# Patient Record
Sex: MALE | Race: BLACK OR AFRICAN AMERICAN | NOT HISPANIC OR LATINO | Employment: FULL TIME | URBAN - METROPOLITAN AREA
[De-identification: names, ages, dates, MRNs, and addresses within clinical notes are randomized per-mention and may not be internally consistent; named-entity substitution may affect disease eponyms.]

---

## 2017-07-17 ENCOUNTER — OFFICE VISIT (OUTPATIENT)
Dept: FAMILY MEDICINE | Facility: HOSPITAL | Age: 32
End: 2017-07-17
Attending: FAMILY MEDICINE
Payer: COMMERCIAL

## 2017-07-17 VITALS
HEART RATE: 67 BPM | WEIGHT: 145.75 LBS | DIASTOLIC BLOOD PRESSURE: 67 MMHG | BODY MASS INDEX: 28.61 KG/M2 | SYSTOLIC BLOOD PRESSURE: 117 MMHG | HEIGHT: 60 IN

## 2017-07-17 DIAGNOSIS — R10.9 ABDOMINAL PAIN, UNSPECIFIED LOCATION: ICD-10-CM

## 2017-07-17 DIAGNOSIS — R19.7 DIARRHEA, UNSPECIFIED TYPE: ICD-10-CM

## 2017-07-17 DIAGNOSIS — Z00.00 ENCOUNTER FOR ANNUAL HEALTH EXAMINATION: Primary | ICD-10-CM

## 2017-07-17 DIAGNOSIS — Z00.00 ENCOUNTER FOR HEALTH MAINTENANCE EXAMINATION: ICD-10-CM

## 2017-07-17 DIAGNOSIS — K25.9 GASTRIC ULCER, UNSPECIFIED CHRONICITY, UNSPECIFIED WHETHER GASTRIC ULCER HEMORRHAGE OR PERFORATION PRESENT: ICD-10-CM

## 2017-07-17 DIAGNOSIS — L81.9 HYPERPIGMENTED SKIN LESION: ICD-10-CM

## 2017-07-17 PROCEDURE — 99203 OFFICE O/P NEW LOW 30 MIN: CPT | Performed by: STUDENT IN AN ORGANIZED HEALTH CARE EDUCATION/TRAINING PROGRAM

## 2017-07-17 NOTE — PROGRESS NOTES
History & Physical  \[facility]\"" FAMILY PRACTICE      SUBJECTIVE:     History of Present Illness:  Patient is a 31 y.o. male presents for chronic abdominal pain that is associated with episodes of diarrhea and constipation that worsens during times of stress/ travel and is relieved with defecation. He has had the sudden urge to defecate for years that fluctuates. His stool is soft, well formed, and has no blood or melena. The frequency of his stool changes as well. He also has intermittent periods of constipation. He denies fever, chills, or a family history of GI medical problems. Certain foods do worsen his abdominal pain with no specific ones noted. He does not eat dairy. The only associated symptom with his urge to defecate is diaphoresis. He denies palpitations, tachycardia, and anxiety symptoms. He reports going to a GI physician years ago who did a colonoscopy and suspected irritable bowel syndrome. Rifaximin was taken which resolved many of his symptoms. He reports that he had ulcers seen on colonoscopy. He also comes in for a hyperpigmentation on his left elbow that is transient for 2 years. It appears randomly and then goes away. Steroids were tried but it did not resolve.     Review of patient's allergies indicates:  No Known Allergies    Past Medical Hx: None  Past surgical hx: hernia repair child  Family History: Mother has HTN and DM    Social History   Substance Use Topics    Smoking status: None    Smokeless tobacco: Not on file    Alcohol use Socially     Sexual Hx: Sexually active with female     Travel Hx: Within the last year went to [facility] and [place]    Review of Systems:  As per Subjective  Review of Systems   Constitutional: Negative for chills, diaphoresis, fever, malaise/fatigue and weight loss.   HENT: Negative for hearing loss.    Eyes: Negative for blurred vision, double vision, pain, discharge and redness.   Respiratory: Positive for cough. Negative for sputum production, shortness of  breath and wheezing.         Dry cough associated with sinusitus   Cardiovascular: Negative for chest pain, palpitations and leg swelling.   Gastrointestinal: Positive for abdominal pain, constipation and diarrhea. Negative for blood in stool, melena, nausea and vomiting.   Genitourinary: Negative for dysuria, frequency, hematuria and urgency.   Musculoskeletal: Negative for joint pain and myalgias.   Skin: Positive for rash.        Bilateral arms   Neurological: Negative.  Negative for weakness and headaches.   Psychiatric/Behavioral: Negative.        OBJECTIVE:     Vital Signs (Most Recent)  Pulse: 67 (07/17/17 1450)  BP: 117/67 (07/17/17 1450)    Physical Exam:  Physical Exam   Constitutional: He is oriented to person, place, and time and well-developed, well-nourished, and in no distress.   HENT:   Head: Normocephalic and atraumatic.   Nose: Nose normal.   Mouth/Throat: Oropharynx is clear and moist.   Eyes: Conjunctivae and EOM are normal. Pupils are equal, round, and reactive to light.   Neck: Normal range of motion. Neck supple.   Cardiovascular: Normal rate, regular rhythm, normal heart sounds and intact distal pulses.    No murmur heard.  Pulmonary/Chest: Effort normal and breath sounds normal.   Abdominal: Soft. Bowel sounds are normal. He exhibits no distension and no mass. There is no tenderness. There is no rebound and no guarding.   Musculoskeletal: Normal range of motion.   Neurological: He is alert and oriented to person, place, and time.   Skin: Skin is warm and dry. Rash noted.   Hyperpigmented lesion on left elbow.     Hyperpigmented lesion on right wrist   Psychiatric: Affect normal.       ASSESSMENT/PLAN:   31 y.o.male with a unconfirmed history of stomach ulcers and irritable bowel syndrome (he was scoped in past but was unsure of results) presents with abdominal pain and transient diarrhea, urgency, and constipation. He also complains of a transient hyperpigmented rash on both extremities.              Plan:     Abdominal pain associated with Diarrhea/Constipation  -2 Omaha III criteria are met for Irritable Bowel Syndrome   -abdominal pain improves with defecation   - abdominal pain associated with change in stool frequency  -No alarming features such as melena, blood in stool, weight loss or family history of GI cancers/Inflammatory bowel disease  -Colonoscopy ordered to rule out ulcerative colitis  -Consider dicyclomine to prevent distressing urges    Hyperpigmented rash  -Punch biopsy scheduled  -Rash does not appear to be dermatitis herpetiformis and patient appears well nourished with a recent weight gain and no signs of malabsorption    Routine physical  -Physical exam was normal  -cbc  -cmp  -lipid panel  -Hepatitis panel    Sexually active male  -Patient asked for STD screening   -HIV  -C/G urine DNA  -RPR    Return to clinic for punch biopsy and lab results    Kolton Cota MD    7/17/2017 Kolton Cota M.D.

## 2017-07-19 NOTE — PROGRESS NOTES
I assume primary medical responsibility for this patient, I have seen the patient, reviewed the case history, findings, diagnosis and treatment plan with the resident and agree that the care is reasonable and necessary.  Frida Gonzalez  7/19/2017

## 2017-07-20 ENCOUNTER — LAB VISIT (OUTPATIENT)
Dept: LAB | Facility: HOSPITAL | Age: 32
End: 2017-07-20
Attending: STUDENT IN AN ORGANIZED HEALTH CARE EDUCATION/TRAINING PROGRAM
Payer: COMMERCIAL

## 2017-07-20 DIAGNOSIS — Z00.00 ENCOUNTER FOR ANNUAL HEALTH EXAMINATION: ICD-10-CM

## 2017-07-20 DIAGNOSIS — Z00.00 ENCOUNTER FOR HEALTH MAINTENANCE EXAMINATION: ICD-10-CM

## 2017-07-20 PROCEDURE — 87591 N.GONORRHOEAE DNA AMP PROB: CPT

## 2017-07-21 LAB
C TRACH DNA SPEC QL NAA+PROBE: NOT DETECTED
N GONORRHOEA DNA SPEC QL NAA+PROBE: NOT DETECTED

## 2017-07-31 ENCOUNTER — PROCEDURE VISIT (OUTPATIENT)
Dept: FAMILY MEDICINE | Facility: HOSPITAL | Age: 32
End: 2017-07-31
Attending: FAMILY MEDICINE
Payer: COMMERCIAL

## 2017-07-31 VITALS
HEIGHT: 62 IN | SYSTOLIC BLOOD PRESSURE: 123 MMHG | BODY MASS INDEX: 26.17 KG/M2 | HEART RATE: 64 BPM | WEIGHT: 142.19 LBS | DIASTOLIC BLOOD PRESSURE: 69 MMHG

## 2017-07-31 DIAGNOSIS — L81.9 HYPERPIGMENTED SKIN LESION: Primary | ICD-10-CM

## 2017-07-31 DIAGNOSIS — K58.0 IRRITABLE BOWEL SYNDROME WITH DIARRHEA: ICD-10-CM

## 2017-07-31 PROBLEM — K58.9 IRRITABLE BOWEL SYNDROME (IBS): Status: ACTIVE | Noted: 2017-07-31

## 2017-07-31 RX ORDER — DICYCLOMINE HYDROCHLORIDE 20 MG/1
20 TABLET ORAL 4 TIMES DAILY
Qty: 56 TABLET | Refills: 0 | Status: SHIPPED | OUTPATIENT
Start: 2017-07-31 | End: 2017-08-14

## 2017-07-31 NOTE — PROCEDURES
"Azam Gregory is a 31 y.o. male patient coming in for a biopsy of a hyperpigmented circular lesion on his left arm. The lesion is on his left elbow that is transient for 2 years. He denies scaling, bleeding, and pruritis. It appears randomly and then goes away. He denies contact with any metals or other materials. Steroids were tried but it did not resolve.     Pulse: 64 (07/31/17 1429)  BP: 123/69 (07/31/17 1429)  Weight: 64.5 kg (142 lb 3.2 oz) (07/31/17 1429)  Height: 5' 2" (157.5 cm) (07/31/17 1429)       PRE-OP DIAGNOSIS: hyperpigmented skin lesion on left arm  POST-OP DIAGNOSIS: Same   PROCEDURE: punch biopsy  Performing Physician: Dr. Kolton Cota  Supervising Physician (if applicable): Dr. Ricardo Hart     PROCEDURE:        Punch 3 mm     The area surrounding the skin lesion was prepared and draped in the usual sterile manner. The lesion was removed in the usual manner by the biopsy method noted above. Hemostasis was assured. The patient tolerated the procedure well.     Closure:               1 suture     Followup: The patient tolerated the procedure well without complications.  Standard post-procedure care is explained and return precautions are given.      Procedures    Consent for a punch biopsy of his left arm was obtained. The risks of the procedure were given. The patient understood the risks and affirmed he wanted the procedure.   A punch biopsy of his left arm was performed. One stitch was placed in the wound.       Return to clinic in  7-10 days for results and removal of the stitch.     Kolton Cota  7/31/2017    "

## 2017-08-16 ENCOUNTER — OFFICE VISIT (OUTPATIENT)
Dept: NEUROLOGY | Facility: HOSPITAL | Age: 32
End: 2017-08-16
Attending: INTERNAL MEDICINE
Payer: COMMERCIAL

## 2017-08-16 VITALS
HEART RATE: 61 BPM | DIASTOLIC BLOOD PRESSURE: 67 MMHG | SYSTOLIC BLOOD PRESSURE: 117 MMHG | HEIGHT: 63 IN | WEIGHT: 145.81 LBS | BODY MASS INDEX: 25.84 KG/M2 | TEMPERATURE: 98 F | RESPIRATION RATE: 16 BRPM

## 2017-08-16 DIAGNOSIS — R19.7 DIARRHEA, UNSPECIFIED TYPE: Primary | ICD-10-CM

## 2017-08-16 PROCEDURE — 99214 OFFICE O/P EST MOD 30 MIN: CPT | Performed by: INTERNAL MEDICINE

## 2017-08-16 RX ORDER — DICYCLOMINE HYDROCHLORIDE 10 MG/1
10 CAPSULE ORAL EVERY 8 HOURS PRN
Qty: 100 CAPSULE | Refills: 3 | Status: SHIPPED | OUTPATIENT
Start: 2017-08-16 | End: 2017-09-15

## 2017-08-16 RX ORDER — DICYCLOMINE HYDROCHLORIDE 20 MG/1
20 TABLET ORAL 3 TIMES DAILY
COMMUNITY
End: 2017-08-16

## 2017-08-16 NOTE — LETTER
August 16, 2017      Kolton Cota MD  [address]  [address]  [place] [address]           [facility]  [address] [place] [address]  Phone: [phone]  Fax: [phone]          Patient: Azam Gregory   MR Number: [id number]   YOB: 1985   Date of Visit: 8/16/2017       Dear Dr. Kolton Cota:    Thank you for referring Azam Gregory to me for evaluation. Attached you will find relevant portions of my assessment and plan of care.    If you have questions, please do not hesitate to call me. I look forward to following Azam Gregory along with you.    Sincerely,    Alf Medrano MD    Enclosure  CC:  No Recipients    If you would like to receive this communication electronically, please contact [email or web address] or [phone] to request more information on [facility] [facility] access.    For providers and/or their staff who would like to refer a patient to [facility], please contact us through our one-stop-shop provider referral line, [facility] , at [phone].    If you feel you have received this communication in error or would no longer like to receive these types of communications, please e-mail [email or web address]

## 2017-08-16 NOTE — PROGRESS NOTES
[facility]    CC: diarrhea     HPI 32 y.o. male with past medical history of chronic, intermittent diarrhea which occurs several times per week in which he has 4-5 formed stools per day associated with a big event, travel or something that causes him anxiety with associated generalized burning and cramping abdominal pain which are relived with having a BM.  He reports he has had these symptoms since childhood.  He denies weight loss, melena, hematochezia, nausea and vomiting.  He has tried rifaximin for these symptoms and felt well on the rifaximin but his symptoms returned once he was off.  He currently takes PRN bentyl and feels that his cramping is improved with this.  Other than these events he has 1-2 BM's daily on average.    Records reviewed and summarized as above.      Past Medical History:   Diagnosis Date    IBS (irritable bowel syndrome)     Ulcer          Past Surgical History:   Procedure Laterality Date    braces      HERNIA REPAIR         Social History  Social History   Substance Use Topics    Smoking status: Former Smoker     Packs/day: 0.10     Years: 2.00     Types: Cigarettes    Smokeless tobacco: Never Used    Alcohol use 1.8 oz/week     3 Standard drinks or equivalent per week      Comment: 3 drinks weekly         Family History  No family history of colon cancer    Review of Systems  General ROS: negative for chills, fever.  Positive for weight gain w  Psychological ROS: negative for hallucination, depression or suicidal ideation  Ophthalmic ROS: negative for blurry vision, photophobia or eye pain  ENT ROS: negative for epistaxis, sore throat or rhinorrhea  Respiratory ROS: no cough, shortness of breath, or wheezing  Cardiovascular ROS: no chest pain or dyspnea on exertion  Gastrointestinal ROS: no abdominal pain, change in bowel habits.  Positive for chronic, intermittent increased stool frequency  Genito-Urinary ROS: no dysuria, trouble voiding, or  "hematuria  Musculoskeletal ROS: negative for gait disturbance or muscular weakness  Neurological ROS: no syncope or seizures; no ataxia  Dermatological ROS: negative for pruritis, rash and jaundice    Physical Examination  /67 (BP Location: Left arm, Patient Position: Sitting)   Pulse 61   Temp 97.7 °F (36.5 °C) (Oral)   Resp 16   Ht 5' 2.5" (1.588 m)   Wt 66.1 kg (145 lb 12.8 oz)   BMI 26.24 kg/m²   General appearance: alert, cooperative, no distress  HENT: Normocephalic, atraumatic, neck symmetrical, no nasal discharge   Eyes: conjunctivae/corneas clear, PERRL, EOM's intact  Lungs: clear to auscultation bilaterally, no dullness to percussion bilaterally  Heart: regular rate and rhythm without rub; no displacement of the PMI   Abdomen: soft, non-tender; bowel sounds normoactive; no organomegaly  Extremities: extremities symmetric; no clubbing, cyanosis, or edema  Integument: Skin color, texture, turgor normal; no rashes; hair distrubution normal  Neurologic: Alert and oriented X 3, normal strength, normal coordination and gait  Psychiatric: no pressured speech; normal affect; no evidence of impaired cognition     Labs:  Hep C ab neg  H/H 13.6/40.1    Imaging: No imaging to review    Assessment: The patient is a 31 yo man with IBS-D who presents to GI clinic for further evaluation. He has classic IBS symptoms over many years primarily with diarrhea which is worse with stress. His symptoms are 70% improved with tid bentyl prescribed by PCP     Plan:   Continue PRN bentyl for symptoms of intestinal spasm plus immodium prn diarrhea    Alf Medrano MD   [address] [place] [address], [address]   [place] [address] [phone]     "

## 2017-08-17 ENCOUNTER — OFFICE VISIT (OUTPATIENT)
Dept: FAMILY MEDICINE | Facility: HOSPITAL | Age: 32
End: 2017-08-17
Attending: FAMILY MEDICINE
Payer: COMMERCIAL

## 2017-08-17 VITALS
BODY MASS INDEX: 25.7 KG/M2 | SYSTOLIC BLOOD PRESSURE: 113 MMHG | HEART RATE: 61 BPM | DIASTOLIC BLOOD PRESSURE: 70 MMHG | HEIGHT: 63 IN | WEIGHT: 145.06 LBS

## 2017-08-17 DIAGNOSIS — K58.9 IRRITABLE BOWEL SYNDROME, UNSPECIFIED TYPE: ICD-10-CM

## 2017-08-17 DIAGNOSIS — Z98.890 S/P BIOPSY: Primary | ICD-10-CM

## 2017-08-17 PROCEDURE — 99213 OFFICE O/P EST LOW 20 MIN: CPT | Performed by: STUDENT IN AN ORGANIZED HEALTH CARE EDUCATION/TRAINING PROGRAM

## 2017-08-17 NOTE — PROGRESS NOTES
History & Physical  [facility] PRACT[facility]      SUBJECTIVE:       CC: punch biopsy results f/u and IBS    History of Present Illness:  Patient is a 32 y.o. male with a pmhx of IBS and a hyperpgimented lesion on bilateral arms is coming is for results of his punch biopsy which showed a lentigo.       Review of patient's allergies indicates:  No Known Allergies    Past Medical Hx: None  Past surgical hx: hernia repair child  Family History: Mother has HTN and DM    Social History   Substance Use Topics    Smoking status: None    Smokeless tobacco: Not on file    Alcohol use Socially     Sexual Hx: Sexually active with female     Review of Systems:  As per Subjective  Review of Systems   Constitutional: Negative for chills, diaphoresis, fever, malaise/fatigue and weight loss.   HENT: Negative for hearing loss.    Eyes: Negative for blurred vision, double vision, pain, discharge and redness.   Respiratory: Negative for cough, sputum production, shortness of breath and wheezing.         Dry cough associated with sinusitus   Cardiovascular: Negative for chest pain, palpitations and leg swelling.   Gastrointestinal: Positive for abdominal pain and diarrhea. Negative for blood in stool, constipation, melena, nausea and vomiting.   Genitourinary: Negative for dysuria, frequency, hematuria and urgency.   Musculoskeletal: Negative for joint pain and myalgias.   Skin: Positive for rash.        Bilateral arms   Neurological: Negative.  Negative for weakness and headaches.   Psychiatric/Behavioral: Negative.        OBJECTIVE:     Vital Signs (Most Recent)  Pulse: 61 (08/17/17 1001)  BP: 113/70 (08/17/17 1001)    Physical Exam:  Physical Exam   Constitutional: He is oriented to person, place, and time and well-developed, well-nourished, and in no distress.   HENT:   Head: Normocephalic and atraumatic.   Nose: Nose normal.   Mouth/Throat: Oropharynx is clear and moist.   Eyes: Conjunctivae and EOM are normal. Pupils are equal,  round, and reactive to light.   Neck: Normal range of motion. Neck supple.   Cardiovascular: Normal rate, regular rhythm, normal heart sounds and intact distal pulses.    No murmur heard.  Pulmonary/Chest: Effort normal and breath sounds normal.   Abdominal: Soft. Bowel sounds are normal. He exhibits no distension and no mass. There is no tenderness. There is no rebound and no guarding.   Musculoskeletal: Normal range of motion.   Neurological: He is alert and oriented to person, place, and time.   Skin: Skin is warm and dry. Rash noted.   Hyperpigmented lesion on left elbow.     Hyperpigmented lesion on right wrist    Punch biopsy scar healing well with no drainage, erythema, scale on top   Psychiatric: Affect normal.       ASSESSMENT/PLAN:   32 y.o.male with a unconfirmed history of irritable bowel syndrome coming in for results of his punch biopsy of his hyperpgimented lesion on his left arm that has healed.            Plan:     Punch biopsy  - Lentigo   -Pt educated on benign nature of lesion   -Pt reports    IBS  -continue dicyclomine 10 mg QID  -GI saw and recommended to continue dicyclomine and immodium prn for diarrhea  -Pt reports significant improvement of IBS symptoms with no significant side effects    Hyperpigmented rash  -Punch biopsy wound healing well  -No stiches seen    RTC in 6 months for monitoring IBS and well exam     Kolton Cota MD    8/17/2017 Kolton Cota M.D.

## 2017-08-17 NOTE — PROGRESS NOTES
I was present in clinic when the patient was seen and I discussed the case with Dr. Cota. I also saw and examined the patient. I have reviewed and agree with the assessment and plan.

## 2018-01-31 ENCOUNTER — LAB VISIT (OUTPATIENT)
Dept: LAB | Facility: HOSPITAL | Age: 33
End: 2018-01-31
Attending: FAMILY MEDICINE
Payer: COMMERCIAL

## 2018-01-31 ENCOUNTER — OFFICE VISIT (OUTPATIENT)
Dept: FAMILY MEDICINE | Facility: HOSPITAL | Age: 33
End: 2018-01-31
Attending: FAMILY MEDICINE
Payer: COMMERCIAL

## 2018-01-31 VITALS
HEIGHT: 62 IN | HEART RATE: 70 BPM | WEIGHT: 147.94 LBS | SYSTOLIC BLOOD PRESSURE: 111 MMHG | DIASTOLIC BLOOD PRESSURE: 74 MMHG | BODY MASS INDEX: 27.22 KG/M2

## 2018-01-31 DIAGNOSIS — Z11.3 SCREEN FOR STD (SEXUALLY TRANSMITTED DISEASE): ICD-10-CM

## 2018-01-31 DIAGNOSIS — R09.81 NASAL CONGESTION: ICD-10-CM

## 2018-01-31 DIAGNOSIS — J32.9 SINUSITIS, UNSPECIFIED CHRONICITY, UNSPECIFIED LOCATION: ICD-10-CM

## 2018-01-31 DIAGNOSIS — Z71.85 VACCINE COUNSELING: ICD-10-CM

## 2018-01-31 DIAGNOSIS — Z11.3 SCREEN FOR STD (SEXUALLY TRANSMITTED DISEASE): Primary | ICD-10-CM

## 2018-01-31 PROCEDURE — 99213 OFFICE O/P EST LOW 20 MIN: CPT | Mod: 25 | Performed by: STUDENT IN AN ORGANIZED HEALTH CARE EDUCATION/TRAINING PROGRAM

## 2018-01-31 PROCEDURE — 86703 HIV-1/HIV-2 1 RESULT ANTBDY: CPT

## 2018-01-31 PROCEDURE — 90686 IIV4 VACC NO PRSV 0.5 ML IM: CPT

## 2018-01-31 PROCEDURE — 87491 CHLMYD TRACH DNA AMP PROBE: CPT

## 2018-01-31 PROCEDURE — 86592 SYPHILIS TEST NON-TREP QUAL: CPT

## 2018-01-31 PROCEDURE — 36415 COLL VENOUS BLD VENIPUNCTURE: CPT

## 2018-01-31 RX ORDER — FLUTICASONE PROPIONATE 50 MCG
1 SPRAY, SUSPENSION (ML) NASAL DAILY
Qty: 1 BOTTLE | Refills: 0 | Status: SHIPPED | OUTPATIENT
Start: 2018-01-31 | End: 2018-02-05

## 2018-01-31 RX ORDER — MINERAL OIL
180 ENEMA (ML) RECTAL DAILY
Qty: 7 TABLET | Refills: 0 | Status: SHIPPED | OUTPATIENT
Start: 2018-01-31 | End: 2018-06-18

## 2018-01-31 RX ORDER — OXYMETAZOLINE HCL 0.05 %
2 SPRAY, NON-AEROSOL (ML) NASAL 2 TIMES DAILY
Qty: 30 ML | Refills: 0 | Status: SHIPPED | OUTPATIENT
Start: 2018-01-31 | End: 2018-02-03

## 2018-01-31 NOTE — PROGRESS NOTES
History & Physical  [facility] PRACT[facility]      SUBJECTIVE:     History of Present Illness:  Patient is a 32 y.o. male with a pmhx of IBS presents for nasal congestion, rhinorrhea, and sore throat. He denies fever, chills, cough, SOB, and pleuritic chest pain. He also requests an STD screening. He denies new sexual contacts, dysuria, increased frequency, penile discharge, weight loss, fevers, weakness, rash, exposure to HIV known patient. His IBS have improved with bentyl. He also has taken loperamide for the diarrhea symptoms. He is also seeing GI for this management. He also is open to receiving the flu shot today. He has no other complaints today.     Review of patient's allergies indicates:  No Known Allergies    Past Medical Hx: IBS  Past surgical hx: hernia repair child  Family History: Mother has HTN and DM    Social History   Substance Use Topics    Smoking status: None    Smokeless tobacco: Not on file    Alcohol use Socially     Sexual Hx: Sexually active with female       Review of Systems:  As per Subjective  Review of Systems   Constitutional: Negative for chills, diaphoresis, fever, malaise/fatigue and weight loss.   HENT: Positive for congestion and sore throat. Negative for hearing loss.    Eyes: Negative for blurred vision, double vision, pain, discharge and redness.   Respiratory: Negative for cough, sputum production, shortness of breath and wheezing.    Cardiovascular: Negative for chest pain, palpitations and leg swelling.   Gastrointestinal: Negative for abdominal pain, blood in stool, constipation, diarrhea, melena, nausea and vomiting.   Genitourinary: Negative for dysuria, frequency, hematuria and urgency.   Musculoskeletal: Negative for joint pain and myalgias.   Skin: Negative for rash.   Neurological: Negative.  Negative for weakness and headaches.   Psychiatric/Behavioral: Negative.        OBJECTIVE:     Vital Signs (Most Recent)  Pulse: 70 (01/31/18 1508)  BP: 111/74 (01/31/18  1508)    Physical Exam:  Physical Exam   Constitutional: He is oriented to person, place, and time and well-developed, well-nourished, and in no distress.   HENT:   Head: Normocephalic and atraumatic.   Nose: Mucosal edema and rhinorrhea present.   Mouth/Throat: Oropharynx is clear and moist. No oropharyngeal exudate, posterior oropharyngeal edema or posterior oropharyngeal erythema.   Eyes: Conjunctivae and EOM are normal. Pupils are equal, round, and reactive to light.   Neck: Normal range of motion. Neck supple.   Cardiovascular: Normal rate, regular rhythm, normal heart sounds and intact distal pulses.    No murmur heard.  Pulmonary/Chest: Effort normal and breath sounds normal.   Abdominal: Soft. Bowel sounds are normal. He exhibits no distension and no mass. There is no tenderness. There is no rebound and no guarding.   Musculoskeletal: Normal range of motion.   Neurological: He is alert and oriented to person, place, and time.   Skin: Skin is warm and dry. No rash noted.   Psychiatric: Affect normal.       ASSESSMENT/PLAN:   32 y.o.male with a unconfirmed history of irritable bowel syndrome  presents with nasal congestion, rhinorrhea and sore throat along with STD screening per request with no symptoms.          Plan:     Azam was seen today for follow-up.    Diagnoses and all orders for this visit:    Screen for STD (sexually transmitted disease)  -     HIV-1 and HIV-2 antibodies; Future  -     C. trachomatis/N. gonorrhoeae by AMP DNA Urine; Future  -     RPR; Future    Sinusitis, unspecified chronicity, unspecified location  -     fluticasone (FLONASE) 50 mcg/actuation nasal spray; 1 spray (50 mcg total) by Each Nare route once daily.  -     fexofenadine (ALLEGRA) 180 MG tablet; Take 1 tablet (180 mg total) by mouth once daily.  -     oxymetazoline (AFRIN) 0.05 % nasal spray; 2 sprays by Nasal route 2 (two) times daily.    Vaccine counseling  -     Cancel: Influenza - Quadrivalent (3 years & older)    Other  orders  -     Influenza - Quadrivalent (3 years & older) (PF)      Sexually active male  -Patient asked for STD screening with no symptoms  -HIV  -C/G urine DNA  -RPR    Return to clinic in 3 months    Kolton Cota MD    1/31/2018 Kolton Cota M.D.

## 2018-02-01 LAB
C TRACH DNA SPEC QL NAA+PROBE: NOT DETECTED
HIV 1+2 AB+HIV1 P24 AG SERPL QL IA: NEGATIVE
N GONORRHOEA DNA SPEC QL NAA+PROBE: NOT DETECTED
RPR SER QL: NORMAL

## 2018-02-01 NOTE — PROGRESS NOTES
I assume primary medical responsibility for this patient, I have reviewed the case history, findings, diagnosis and treatment plan with the resident and agree that the care is reasonable and necessary. This service has been performed by a resident without the presence of a teaching physician under the primary care exception  Frida Gonzalez  2/1/2018

## 2018-04-28 NOTE — PROGRESS NOTES
Case discussed with resident at time of visit.  Patient seen and evaluated by me.  I have reviewed and concur with the resident's history, physical exam, assessment, and plan.

## 2018-06-18 ENCOUNTER — OFFICE VISIT (OUTPATIENT)
Dept: FAMILY MEDICINE | Facility: HOSPITAL | Age: 33
End: 2018-06-18
Attending: FAMILY MEDICINE
Payer: COMMERCIAL

## 2018-06-18 VITALS
BODY MASS INDEX: 27.06 KG/M2 | DIASTOLIC BLOOD PRESSURE: 72 MMHG | HEIGHT: 62 IN | WEIGHT: 147.06 LBS | SYSTOLIC BLOOD PRESSURE: 117 MMHG | HEART RATE: 63 BPM
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
## 2018-06-18 PROCEDURE — 99213 OFFICE O/P EST LOW 20 MIN: CPT | Performed by: FAMILY MEDICINE

## 2018-06-18 RX ORDER — ACETAMINOPHEN AND CODEINE PHOSPHATE 300; 30 MG/1; MG/1
TABLET ORAL
COMMUNITY
Start: 2018-06-17 | End: 2018-10-24

## 2018-06-18 RX ORDER — CYCLOBENZAPRINE HCL 10 MG
TABLET ORAL
COMMUNITY
Start: 2018-06-17 | End: 2018-10-24
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
## 2018-07-03 NOTE — PROGRESS NOTES
I have reviewed the notes, assessments, and/or procedures performed, I concur with her/his documentation of Azam Gregory.

## 2018-09-10 ENCOUNTER — OFFICE VISIT (OUTPATIENT)
Dept: FAMILY MEDICINE | Facility: HOSPITAL | Age: 33
End: 2018-09-10
Attending: FAMILY MEDICINE
Payer: COMMERCIAL

## 2018-09-10 ENCOUNTER — LAB VISIT (OUTPATIENT)
Dept: LAB | Facility: HOSPITAL | Age: 33
End: 2018-09-10
Attending: FAMILY MEDICINE
Payer: COMMERCIAL

## 2018-09-10 VITALS
HEIGHT: 63 IN | DIASTOLIC BLOOD PRESSURE: 84 MMHG | BODY MASS INDEX: 25.82 KG/M2 | WEIGHT: 145.75 LBS | SYSTOLIC BLOOD PRESSURE: 118 MMHG | HEART RATE: 59 BPM

## 2018-09-10 DIAGNOSIS — Z71.1 CONCERN ABOUT STD IN MALE WITHOUT DIAGNOSIS: ICD-10-CM

## 2018-09-10 DIAGNOSIS — Z71.1 CONCERN ABOUT STD IN MALE WITHOUT DIAGNOSIS: Primary | ICD-10-CM

## 2018-09-10 LAB
ALBUMIN SERPL BCP-MCNC: 4.2 G/DL
ALP SERPL-CCNC: 59 U/L
ALT SERPL W/O P-5'-P-CCNC: 31 U/L
ANION GAP SERPL CALC-SCNC: 9 MMOL/L
AST SERPL-CCNC: 26 U/L
BILIRUB SERPL-MCNC: 0.6 MG/DL
BUN SERPL-MCNC: 16 MG/DL
CALCIUM SERPL-MCNC: 9.4 MG/DL
CHLORIDE SERPL-SCNC: 105 MMOL/L
CO2 SERPL-SCNC: 24 MMOL/L
CREAT SERPL-MCNC: 1.2 MG/DL
EST. GFR  (AFRICAN AMERICAN): >60 ML/MIN/1.73 M^2
EST. GFR  (NON AFRICAN AMERICAN): >60 ML/MIN/1.73 M^2
GLUCOSE SERPL-MCNC: 93 MG/DL
POTASSIUM SERPL-SCNC: 4.3 MMOL/L
PROT SERPL-MCNC: 7.8 G/DL
SODIUM SERPL-SCNC: 138 MMOL/L

## 2018-09-10 PROCEDURE — 80053 COMPREHEN METABOLIC PANEL: CPT

## 2018-09-10 PROCEDURE — 86703 HIV-1/HIV-2 1 RESULT ANTBDY: CPT

## 2018-09-10 PROCEDURE — 86592 SYPHILIS TEST NON-TREP QUAL: CPT

## 2018-09-10 PROCEDURE — 99213 OFFICE O/P EST LOW 20 MIN: CPT | Performed by: STUDENT IN AN ORGANIZED HEALTH CARE EDUCATION/TRAINING PROGRAM

## 2018-09-10 PROCEDURE — 36415 COLL VENOUS BLD VENIPUNCTURE: CPT
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
## 2018-09-11 ENCOUNTER — TELEPHONE (OUTPATIENT)
Dept: FAMILY MEDICINE | Facility: HOSPITAL | Age: 33
End: 2018-09-11

## 2018-09-11 LAB
HIV 1+2 AB+HIV1 P24 AG SERPL QL IA: NEGATIVE
RPR SER QL: NORMAL

## 2018-09-11 NOTE — TELEPHONE ENCOUNTER
----- Message from Alyson Post MA sent at 9/11/2018  8:42 AM CDT -----  Patient missed a call.  Please call again.  Thanks.

## 2018-09-17 NOTE — PROGRESS NOTES
Case discussed with resident at time of visit.  I have reviewed and concur with the resident's evaluation, assessment, and plan.  Continue diet modifications for IBS mgmt.

## 2018-10-24 ENCOUNTER — OFFICE VISIT (OUTPATIENT)
Dept: FAMILY MEDICINE | Facility: HOSPITAL | Age: 33
End: 2018-10-24
Attending: FAMILY MEDICINE
Payer: COMMERCIAL

## 2018-10-24 VITALS
WEIGHT: 148.13 LBS | BODY MASS INDEX: 26.25 KG/M2 | HEIGHT: 63 IN | DIASTOLIC BLOOD PRESSURE: 76 MMHG | SYSTOLIC BLOOD PRESSURE: 121 MMHG | HEART RATE: 57 BPM

## 2018-10-24 DIAGNOSIS — Z23 NEED FOR HPV VACCINE: Primary | ICD-10-CM

## 2018-10-24 PROCEDURE — 90651 9VHPV VACCINE 2/3 DOSE IM: CPT

## 2018-10-24 PROCEDURE — 99213 OFFICE O/P EST LOW 20 MIN: CPT | Performed by: STUDENT IN AN ORGANIZED HEALTH CARE EDUCATION/TRAINING PROGRAM

## 2018-10-24 NOTE — PROGRESS NOTES
History & Physical  [facility] FAMILY PRACTICE    SUBJECTIVE:     CC: HPV vaccine     History of Present Illness:  Patient is a 33 y.o. male with a pmhx of IBS presents for HPV vaccine. He heart about the FDA approving the HPV vaccine for adult men and woman ages 27-45. He is aware that his insurance might not pay but wants the vaccine due to its benefits. He is sexually active and wants to be protected from HPV. He denies dysuria, frequency, discharge, rashes, and fever/chills. He has no other complaints today.     Review of patient's allergies indicates:  No Known Allergies    Past Medical Hx: IBS  Past surgical hx: hernia repair child  Family History: Mother has HTN and DM    Social History   Substance Use Topics    Smoking status: None    Smokeless tobacco: Not on file    Alcohol use Socially     Sexual Hx: Sexually active with female       Review of Systems:  As per Subjective  Review of Systems   Constitutional: Negative for chills, diaphoresis, fever, malaise/fatigue and weight loss.   HENT: Negative for congestion, hearing loss and sore throat.    Eyes: Negative for blurred vision, double vision, pain, discharge and redness.   Respiratory: Negative for cough, sputum production, shortness of breath and wheezing.    Cardiovascular: Negative for chest pain, palpitations and leg swelling.   Gastrointestinal: Negative for abdominal pain, blood in stool, constipation, diarrhea, melena, nausea and vomiting.   Genitourinary: Negative for dysuria, frequency, hematuria and urgency.   Musculoskeletal: Negative for back pain, joint pain and myalgias.   Skin: Negative for rash.   Neurological: Negative.  Negative for weakness and headaches.   Psychiatric/Behavioral: Negative.        OBJECTIVE:     Vital Signs (Most Recent)    Vitals:    10/24/18 1418   BP: 121/76   Pulse: (!) 57        Physical Exam   Constitutional: He is oriented to person, place, and time and well-developed, well-nourished, and in no distress.   HENT:    Head: Normocephalic and atraumatic.   Nose: No mucosal edema or rhinorrhea.   Mouth/Throat: Oropharynx is clear and moist. No oropharyngeal exudate, posterior oropharyngeal edema or posterior oropharyngeal erythema.   Eyes: Conjunctivae and EOM are normal. Pupils are equal, round, and reactive to light.   Neck: Normal range of motion. Neck supple.   Cardiovascular: Normal rate, regular rhythm, normal heart sounds and intact distal pulses.   No murmur heard.  Pulmonary/Chest: Effort normal and breath sounds normal.   Abdominal: Soft. Bowel sounds are normal. He exhibits no distension and no mass. There is no tenderness. There is no rebound and no guarding.   Musculoskeletal: Normal range of motion.   Neurological: He is alert and oriented to person, place, and time.   Skin: Skin is warm and dry. No rash noted.   Psychiatric: Affect normal.       ASSESSMENT/PLAN:     1.HPV Vaccine   2. IBS     Azam was seen today for immunizations.    Diagnoses and all orders for this visit:    Need for HPV vaccine  -     (In Office Administered) HPV Vaccine (9-Valent) (3 Dose) (IM)    IBS  Stable and controlled      Return to clinic prn     10/24/2018 Kolton Cota M.D.

## 2019-08-08 ENCOUNTER — LAB VISIT (OUTPATIENT)
Dept: LAB | Facility: HOSPITAL | Age: 34
End: 2019-08-08
Attending: FAMILY MEDICINE
Payer: COMMERCIAL

## 2019-08-08 ENCOUNTER — OFFICE VISIT (OUTPATIENT)
Dept: FAMILY MEDICINE | Facility: HOSPITAL | Age: 34
End: 2019-08-08
Attending: FAMILY MEDICINE
Payer: COMMERCIAL

## 2019-08-08 VITALS
HEART RATE: 64 BPM | SYSTOLIC BLOOD PRESSURE: 113 MMHG | DIASTOLIC BLOOD PRESSURE: 65 MMHG | BODY MASS INDEX: 25.98 KG/M2 | WEIGHT: 146.63 LBS | HEIGHT: 63 IN

## 2019-08-08 DIAGNOSIS — Z11.3 SCREEN FOR STD (SEXUALLY TRANSMITTED DISEASE): ICD-10-CM

## 2019-08-08 DIAGNOSIS — Z28.9 VACCINATION DELAY: ICD-10-CM

## 2019-08-08 DIAGNOSIS — K58.0 IRRITABLE BOWEL SYNDROME WITH DIARRHEA: ICD-10-CM

## 2019-08-08 DIAGNOSIS — Z11.3 SCREEN FOR STD (SEXUALLY TRANSMITTED DISEASE): Primary | ICD-10-CM

## 2019-08-08 PROBLEM — R19.7 DIARRHEA: Status: RESOLVED | Noted: 2017-07-17 | Resolved: 2019-08-08

## 2019-08-08 PROCEDURE — 99213 OFFICE O/P EST LOW 20 MIN: CPT | Performed by: STUDENT IN AN ORGANIZED HEALTH CARE EDUCATION/TRAINING PROGRAM

## 2019-08-08 PROCEDURE — 36415 COLL VENOUS BLD VENIPUNCTURE: CPT

## 2019-08-08 PROCEDURE — 86592 SYPHILIS TEST NON-TREP QUAL: CPT

## 2019-08-08 PROCEDURE — 80074 ACUTE HEPATITIS PANEL: CPT

## 2019-08-08 PROCEDURE — 90471 IMMUNIZATION ADMIN: CPT

## 2019-08-08 PROCEDURE — 86703 HIV-1/HIV-2 1 RESULT ANTBDY: CPT

## 2019-08-08 NOTE — PROGRESS NOTES
Progress Note   Family Medicine    Subjective:     Chief Complaint:   HPV shot    History of Present Illness:  33 y.o. male with a PMH of IBS presents for his second HPV vaccine today. He has no specific complaints today.     Vaccination- This is his second HPV vaccine of a three series. No rxn to past one.     STD screening- No dysuria, hematuria, discharge, rash or complaints. He wants to get his annual screening.     IBS- symptoms controlled. He does not want a new medications for symptoms and tried to avoid foods that can worsen it. No nausea, vomiting, blood or black in stool.     Previous medical records reviewed and summarized above in HPI.    Currently has co-morbidities including below problem list.    Patient Active Problem List   Diagnosis    Hyperpigmented skin lesion    Abdominal pain    Stomach ulcer    Irritable bowel syndrome (IBS)    Vaccination delay    Screen for STD (sexually transmitted disease)        Past Medical History:   Diagnosis Date    IBS (irritable bowel syndrome)     Ulcer        Past Surgical History:   Procedure Laterality Date    braces      HERNIA REPAIR         Social History  Social History     Tobacco Use    Smoking status: Former Smoker     Packs/day: 0.10     Years: 2.00     Pack years: 0.20     Types: Cigarettes    Smokeless tobacco: Never Used   Substance Use Topics    Alcohol use: Yes     Alcohol/week: 1.8 oz     Types: 3 Standard drinks or equivalent per week     Comment: 3 drinks weekly    Drug use: No       Family History   Problem Relation Age of Onset    Hypertension Mother     Diabetes Mother     Anxiety disorder Mother     Prostate cancer Maternal Grandmother        Review of patient's allergies indicates:  No Known Allergies    Review of Systems   Constitutional: Negative for chills, fever and weight loss.   HENT: Negative for congestion, nosebleeds and sore throat.    Eyes: Negative for blurred vision, photophobia and pain.   Respiratory:  "Negative for cough, shortness of breath and wheezing.    Cardiovascular: Negative for chest pain and leg swelling.   Gastrointestinal: Negative for abdominal pain, blood in stool, constipation, diarrhea and melena.   Genitourinary: Negative for dysuria and hematuria.   Musculoskeletal: Negative for falls and myalgias.   Skin: Negative for itching and rash.   Neurological: Negative for seizures, loss of consciousness and weakness.   Endo/Heme/Allergies: Does not bruise/bleed easily.   Psychiatric/Behavioral: Negative for depression, hallucinations and suicidal ideas.        Physical Examination  /65   Pulse 64   Ht 5' 2.5" (1.588 m)   Wt 66.5 kg (146 lb 9.7 oz)   BMI 26.39 kg/m²   Wt Readings from Last 3 Encounters:   08/08/19 66.5 kg (146 lb 9.7 oz)   10/24/18 67.2 kg (148 lb 2.4 oz)   09/10/18 66.1 kg (145 lb 11.6 oz)     BP Readings from Last 3 Encounters:   08/08/19 113/65   10/24/18 121/76   09/10/18 118/84     Estimated body mass index is 26.39 kg/m² as calculated from the following:    Height as of this encounter: 5' 2.5" (1.588 m).    Weight as of this encounter: 66.5 kg (146 lb 9.7 oz).     Physical Exam   Constitutional: He is oriented to person, place, and time and well-developed, well-nourished, and in no distress. No distress.   HENT:   Head: Normocephalic and atraumatic.   Right Ear: External ear normal.   Left Ear: External ear normal.   Mouth/Throat: No oropharyngeal exudate.   Eyes: Pupils are equal, round, and reactive to light. Conjunctivae and EOM are normal.   Neck: Neck supple.   Cardiovascular: Normal rate, regular rhythm and intact distal pulses. PMI is not displaced. Exam reveals no friction rub.   Pulmonary/Chest: Effort normal and breath sounds normal. Chest wall is not dull to percussion.   Abdominal: Soft. Bowel sounds are normal. There is no hepatosplenomegaly. There is no tenderness. There is no rebound.   Musculoskeletal: He exhibits no edema, tenderness or deformity. "   Neurological: He is alert and oriented to person, place, and time. He has normal strength. Gait normal. Coordination normal.   Skin: Skin is warm, dry and intact. No rash noted. He is not diaphoretic. No cyanosis. Nails show no clubbing.   Psychiatric: Mood, memory and affect normal.        Data reviewed    Previous medical records reviewed and summarized above in HPI.    Laboratory    Review of old Records:  Reviewed per epic and [facility]    Review of old labs:    Lab Results   Component Value Date    WBC 6.87 07/17/2017    HGB 13.6 (L) 07/17/2017    HCT 40.1 07/17/2017    MCV 87 07/17/2017     07/17/2017       Chemistry        Component Value Date/Time     09/10/2018 1159    K 4.3 09/10/2018 1159     09/10/2018 1159    CO2 24 09/10/2018 1159    BUN 16 09/10/2018 1159    CREATININE 1.2 09/10/2018 1159    GLU 93 09/10/2018 1159        Component Value Date/Time    CALCIUM 9.4 09/10/2018 1159    ALKPHOS 59 09/10/2018 1159    AST 26 09/10/2018 1159    ALT 31 09/10/2018 1159    BILITOT 0.6 09/10/2018 1159    ESTGFRAFRICA >60 09/10/2018 1159    EGFRNONAA >60 09/10/2018 1159          No results found for: MG, PHOS  Lab Results   Component Value Date    CHOL 158 07/17/2017    HDL 58 07/17/2017    LDLCALC 77.6 07/17/2017    TRIG 112 07/17/2017     No results found for: TSH  No results found for: HGBA1C   I reviewed the most recent lab tests and pertinent recent labs negative for STDs.      Assessment/Plan    Azam Gregory is a 33 y.o. male who presents to clinic with:    1. Screen for STD (sexually transmitted disease)    2. Vaccination delay    3. Irritable bowel syndrome with diarrhea         Problem List Items Addressed This Visit        ID    Vaccination delay    Relevant Orders    HPV Vaccine (9-Valent) (3 Dose) (IM) (Completed)    Screen for STD (sexually transmitted disease) - Primary    Overview     Negative past screens          Relevant Orders    HIV 1/2 Ag/Ab (4th Gen)    RPR    C. trachomatis/N.  gonorrhoeae by AMP DNA    Hepatitis panel, acute       GI    Irritable bowel syndrome (IBS)    Current Assessment & Plan     Stable and symptoms are manageable   He has a variable diet but is trying to avoid trigger foods                Modified Medications    No medications on file     Additional workup planned: see labs ordered above.      Patient counseled about the importance of healthy dietary habits as well as routine physical activity and exercise for better health outcomes.    The patient's diagnosis and medications were discussed. Proper use of medications was dicussed. I also discussed the importance of close follow up to discuss labs, change or modify her medications if needed, monitor side effects, and further evaluation of medical problems. I also discussed the importance of cancer screening.    Follow up in about 3 months (around 11/8/2019). for further workup and reassessment if labs and tests obtained are stable or sooner as needed    Understanding expressed after counseling regarding diagnosis and recommendations.    Kolton Cota MD    08/08/2019

## 2019-08-09 LAB
HAV IGM SERPL QL IA: NEGATIVE
HBV CORE IGM SERPL QL IA: NEGATIVE
HBV SURFACE AG SERPL QL IA: NEGATIVE
HCV AB SERPL QL IA: NEGATIVE
HIV 1+2 AB+HIV1 P24 AG SERPL QL IA: NEGATIVE
RPR SER QL: NORMAL

## 2019-12-03 ENCOUNTER — OFFICE VISIT (OUTPATIENT)
Dept: FAMILY MEDICINE | Facility: HOSPITAL | Age: 34
End: 2019-12-03
Attending: SPECIALIST
Payer: COMMERCIAL

## 2019-12-03 VITALS
WEIGHT: 142.63 LBS | HEART RATE: 64 BPM | HEIGHT: 63 IN | DIASTOLIC BLOOD PRESSURE: 65 MMHG | SYSTOLIC BLOOD PRESSURE: 106 MMHG | BODY MASS INDEX: 25.27 KG/M2

## 2019-12-03 DIAGNOSIS — Z23 NEED FOR HPV VACCINATION: Primary | ICD-10-CM

## 2019-12-03 DIAGNOSIS — N46.9 INFERTILITY MALE: ICD-10-CM

## 2019-12-03 DIAGNOSIS — N46.9 INFERTILITY MALE: Primary | ICD-10-CM

## 2019-12-03 DIAGNOSIS — K58.0 IRRITABLE BOWEL SYNDROME WITH DIARRHEA: ICD-10-CM

## 2019-12-03 DIAGNOSIS — Z23 NEED FOR HPV VACCINE: ICD-10-CM

## 2019-12-03 DIAGNOSIS — Z91.89 AT RISK FOR OBSTRUCTIVE SLEEP APNEA: ICD-10-CM

## 2019-12-03 PROCEDURE — 90651 9VHPV VACCINE 2/3 DOSE IM: CPT

## 2019-12-03 PROCEDURE — 99213 OFFICE O/P EST LOW 20 MIN: CPT | Mod: 25 | Performed by: STUDENT IN AN ORGANIZED HEALTH CARE EDUCATION/TRAINING PROGRAM

## 2019-12-03 RX ORDER — NORTRIPTYLINE HYDROCHLORIDE 25 MG/1
25 CAPSULE ORAL NIGHTLY
Qty: 30 CAPSULE | Refills: 11 | Status: SHIPPED | OUTPATIENT
Start: 2019-12-03 | End: 2023-06-08

## 2019-12-03 NOTE — PROGRESS NOTES
Progress Note   Family Medicine    Subjective:     Chief Complaint:   HPV shot    History of Present Illness:  34 y.o. male with a PMH of IBS presents for his third HPV vaccine today.     Vaccination- This is Arjun is his 3rd injection for HPV and reports no side effects from this.    Irritable bowel syndrome-the patient reports no diarrhea or constipation but at random times at work he sometimes has abdominal spasms and the urge to go to the bathroom.  This is random and not associated with food.  No nausea vomiting.  No blood in stool.  He reports no heavy alcohol use.  He did try Bentyl and did somewhat improve it, but it had this abdominal pain is still bothering him.  He did try Primrose oil without relief. He does want to see GI again to discuss this further.     At risk for obstructive sleep apnea-the patient reports snoring at night, and fatigue during the day.  He denies morning headaches.  He does want to get a sleep study for this.    Previous medical records reviewed and summarized above in HPI.    Currently has co-morbidities including below problem list.    Patient Active Problem List   Diagnosis    Hyperpigmented skin lesion    Abdominal pain    Stomach ulcer    Irritable bowel syndrome (IBS)    Vaccination delay    Screen for STD (sexually transmitted disease)        Past Medical History:   Diagnosis Date    IBS (irritable bowel syndrome)     Ulcer        Past Surgical History:   Procedure Laterality Date    braces      HERNIA REPAIR         Social History  Social History     Tobacco Use    Smoking status: Former Smoker     Packs/day: 0.10     Years: 2.00     Pack years: 0.20     Types: Cigarettes    Smokeless tobacco: Never Used   Substance Use Topics    Alcohol use: Yes     Alcohol/week: 3.0 standard drinks     Types: 3 Standard drinks or equivalent per week     Comment: 3 drinks weekly    Drug use: No       Family History   Problem Relation Age of Onset    Hypertension Mother      "Diabetes Mother     Anxiety disorder Mother     Prostate cancer Maternal Grandmother        Review of patient's allergies indicates:  No Known Allergies    Review of Systems   Constitutional: Negative for chills, fever and weight loss.   HENT: Negative for congestion, nosebleeds and sore throat.    Eyes: Negative for blurred vision, photophobia and pain.   Respiratory: Negative for cough, shortness of breath and wheezing.    Cardiovascular: Negative for chest pain and leg swelling.   Gastrointestinal: Positive for abdominal pain. Negative for blood in stool, constipation, diarrhea and melena.   Genitourinary: Negative for dysuria and hematuria.   Musculoskeletal: Negative for falls and myalgias.   Skin: Negative for itching and rash.   Neurological: Negative for seizures, loss of consciousness and weakness.   Endo/Heme/Allergies: Does not bruise/bleed easily.   Psychiatric/Behavioral: Negative for depression, hallucinations and suicidal ideas.        Physical Examination  /65   Pulse 64   Ht 5' 2.5" (1.588 m)   Wt 64.7 kg (142 lb 10.2 oz)   BMI 25.67 kg/m²   Wt Readings from Last 3 Encounters:   12/03/19 64.7 kg (142 lb 10.2 oz)   08/08/19 66.5 kg (146 lb 9.7 oz)   10/24/18 67.2 kg (148 lb 2.4 oz)     BP Readings from Last 3 Encounters:   12/03/19 106/65   08/08/19 113/65   10/24/18 121/76     Estimated body mass index is 25.67 kg/m² as calculated from the following:    Height as of this encounter: 5' 2.5" (1.588 m).    Weight as of this encounter: 64.7 kg (142 lb 10.2 oz).     Physical Exam   Constitutional: He is oriented to person, place, and time and well-developed, well-nourished, and in no distress. No distress.   HENT:   Head: Normocephalic and atraumatic.   Right Ear: External ear normal.   Left Ear: External ear normal.   Mouth/Throat: No oropharyngeal exudate.   Eyes: Pupils are equal, round, and reactive to light. Conjunctivae and EOM are normal.   Neck: Neck supple.   Cardiovascular: Normal " rate, regular rhythm and intact distal pulses. PMI is not displaced. Exam reveals no friction rub.   Pulmonary/Chest: Effort normal and breath sounds normal. Chest wall is not dull to percussion.   Abdominal: Soft. Bowel sounds are normal. There is no hepatosplenomegaly. There is no tenderness. There is no rebound.   Musculoskeletal: He exhibits no edema, tenderness or deformity.   Neurological: He is alert and oriented to person, place, and time. He has normal strength. Gait normal. Coordination normal.   Skin: Skin is warm, dry and intact. No rash noted. He is not diaphoretic. No cyanosis. Nails show no clubbing.   Psychiatric: Mood, memory and affect normal.        Data reviewed    Previous medical records reviewed and summarized above in HPI.    Laboratory    Review of old Records:  Reviewed per epic and [facility]    Review of old labs:    Lab Results   Component Value Date    WBC 6.87 07/17/2017    HGB 13.6 (L) 07/17/2017    HCT 40.1 07/17/2017    MCV 87 07/17/2017     07/17/2017       Chemistry        Component Value Date/Time     09/10/2018 1159    K 4.3 09/10/2018 1159     09/10/2018 1159    CO2 24 09/10/2018 1159    BUN 16 09/10/2018 1159    CREATININE 1.2 09/10/2018 1159    GLU 93 09/10/2018 1159        Component Value Date/Time    CALCIUM 9.4 09/10/2018 1159    ALKPHOS 59 09/10/2018 1159    AST 26 09/10/2018 1159    ALT 31 09/10/2018 1159    BILITOT 0.6 09/10/2018 1159    ESTGFRAFRICA >60 09/10/2018 1159    EGFRNONAA >60 09/10/2018 1159          No results found for: MG, PHOS  Lab Results   Component Value Date    CHOL 158 07/17/2017    HDL 58 07/17/2017    LDLCALC 77.6 07/17/2017    TRIG 112 07/17/2017     No results found for: TSH  No results found for: HGBA1C   I reviewed the most recent lab tests and pertinent recent labs negative for STDs.      Assessment/Plan    Azam Gregory is a 34 y.o. male who presents to clinic with:    1. Need for HPV vaccination    2. Irritable bowel syndrome with  "diarrhea    3. At risk for obstructive sleep apnea    4. Need for HPV vaccine        Problem List Items Addressed This Visit        GI    Irritable bowel syndrome (IBS)    Relevant Medications    nortriptyline (PAMELOR) 25 MG capsule  Will try a trial of TCA since he reports "spasms"  Failed bentyl and primrose oil  Educated on FODMAP diet    Other Relevant Orders    Ambulatory referral to Gastroenterology      Other Visit Diagnoses     Need for HPV vaccination    -  Primary    Relevant Orders    (In Office Administered) HPV Vaccine (9-Valent) (3 Dose) (IM) (Completed)    At risk for obstructive sleep apnea        Relevant Orders    Ambulatory referral to Sleep Disorders    Need for HPV vaccine              Modified Medications    No medications on file     Additional workup planned: see labs ordered above.      Patient counseled about the importance of healthy dietary habits as well as routine physical activity and exercise for better health outcomes.    The patient's diagnosis and medications were discussed. Proper use of medications was dicussed. I also discussed the importance of close follow up to discuss labs, change or modify her medications if needed, monitor side effects, and further evaluation of medical problems. I also discussed the importance of cancer screening.    Follow up in about 3 months (around 3/3/2020). for further workup and reassessment if labs and tests obtained are stable or sooner as needed    Understanding expressed after counseling regarding diagnosis and recommendations.    Kolton Cota MD    12/03/2019  "

## 2019-12-13 ENCOUNTER — LAB VISIT (OUTPATIENT)
Dept: LAB | Facility: HOSPITAL | Age: 34
End: 2019-12-13
Payer: COMMERCIAL

## 2019-12-13 DIAGNOSIS — N46.9 INFERTILITY MALE: ICD-10-CM

## 2019-12-13 PROCEDURE — 89320 SEMEN ANAL VOL/COUNT/MOT: CPT

## 2019-12-16 ENCOUNTER — TELEPHONE (OUTPATIENT)
Dept: FAMILY MEDICINE | Facility: HOSPITAL | Age: 34
End: 2019-12-16

## 2019-12-20 LAB
GERM CELLS, SEMEN: NORMAL
PH SMN: 8 [PH]
PMN'S/100 SPERMATAZOA: 0 %
SPECIMEN VOL SMN: 2 ML
SPERM # SMN: 125 M/ML
SPERM # SMN: 250 M
SPERM MOTILE NFR SMN: 79 %
SPERM NORM MOTILE # SMN: 27.7 M (ref 50–?)
SPERM NORM NFR SMN: 14 %
SPERM PROG NFR SMN: 39 %
SPERM SMN: NORMAL
VISC SMN QL: NORMAL
WBC # SMN: 0 M/ML (ref 0–1)

## 2020-01-07 ENCOUNTER — LAB VISIT (OUTPATIENT)
Dept: LAB | Facility: HOSPITAL | Age: 35
End: 2020-01-07
Attending: INTERNAL MEDICINE
Payer: COMMERCIAL

## 2020-01-07 ENCOUNTER — OFFICE VISIT (OUTPATIENT)
Dept: GASTROENTEROLOGY | Facility: CLINIC | Age: 35
End: 2020-01-07
Payer: COMMERCIAL

## 2020-01-07 VITALS — BODY MASS INDEX: 25.43 KG/M2 | HEIGHT: 63 IN | WEIGHT: 143.5 LBS

## 2020-01-07 DIAGNOSIS — K58.0 IRRITABLE BOWEL SYNDROME WITH DIARRHEA: Primary | ICD-10-CM

## 2020-01-07 DIAGNOSIS — K58.0 IRRITABLE BOWEL SYNDROME WITH DIARRHEA: ICD-10-CM

## 2020-01-07 PROCEDURE — 83516 IMMUNOASSAY NONANTIBODY: CPT | Mod: 59

## 2020-01-07 PROCEDURE — 99214 PR OFFICE/OUTPT VISIT, EST, LEVL IV, 30-39 MIN: ICD-10-PCS | Mod: S$GLB,,, | Performed by: INTERNAL MEDICINE

## 2020-01-07 PROCEDURE — 99999 PR PBB SHADOW E&M-EST. PATIENT-LVL III: ICD-10-PCS | Mod: PBBFAC,,, | Performed by: INTERNAL MEDICINE

## 2020-01-07 PROCEDURE — 99214 OFFICE O/P EST MOD 30 MIN: CPT | Mod: S$GLB,,, | Performed by: INTERNAL MEDICINE

## 2020-01-07 PROCEDURE — 36415 COLL VENOUS BLD VENIPUNCTURE: CPT

## 2020-01-07 PROCEDURE — 99999 PR PBB SHADOW E&M-EST. PATIENT-LVL III: CPT | Mod: PBBFAC,,, | Performed by: INTERNAL MEDICINE

## 2020-01-07 RX ORDER — HYOSCYAMINE SULFATE 0.12 MG/1
0.12 TABLET SUBLINGUAL EVERY 6 HOURS PRN
Qty: 60 TABLET | Refills: 2 | Status: SHIPPED | OUTPATIENT
Start: 2020-01-07 | End: 2023-06-08

## 2020-01-07 NOTE — LETTER
January 7, 2020      Kolton Cota MD  [address]  [address]  [place] [address]           [facility]  [address]  [facility] [address]  Phone: [phone]          Patient: Azam Gregory   MR Number: [id number]   YOB: 1985   Date of Visit: 1/7/2020       Dear Dr. Kolton Cota:    Thank you for referring Azam Gregory to me for evaluation. Attached you will find relevant portions of my assessment and plan of care.    If you have questions, please do not hesitate to call me. I look forward to following Azam Gregory along with you.    Sincerely,    Chuck Ambrocio MD    Enclosure  CC:  No Recipients    If you would like to receive this communication electronically, please contact [email or web address] or [phone] to request more information on [facility] [employer] access.    For providers and/or their staff who would like to refer a patient to [facility], please contact us through our one-stop-shop provider referral line, [facility] , at [phone].    If you feel you have received this communication in error or would no longer like to receive these types of communications, please e-mail [email or web address]

## 2020-01-07 NOTE — PROGRESS NOTES
GASTROENTEROLOGY CLINIC NOTE    Reason for visit: The encounter diagnosis was Irritable bowel syndrome with diarrhea.  Referring provider/PCP: Kolton Cota MD    HPI:  Azam Gregory is a 34 y.o. male here today for follow up IBS -D.   Previously seen by Dr. Medrano in 2017.    Patient has had many year history situational diarrhea and situational abdominal pain and cramping.  The symptoms are worse before traveling, before high stress situations, before plane flights, etc.  He will have multiple loose stools in the urge to defecate during these events.  In between these events, he has no symptoms and is otherwise doing well.  He has tried rifaximin in the past and seems to get good results with this but ultimately his symptoms do come back afterwards.  He has tried Bentyl in the past and did seem to have good results with that in the past as well.  His primary care doctor put him on nortriptyline recently but is not taking this every day as this is not designed for immediate relief of symptoms.    Prior Endoscopy:  Dr. Gomez  - had he thinks egd and colonoscopy, we dont have records and he doesn't recall details    (Portions of this note were dictated using voice recognition software and may contain dictation related errors in spelling/grammar/syntax not found on text review)    Review of Systems   Constitutional: Negative for fever and malaise/fatigue.   Respiratory: Negative for cough and shortness of breath.    Cardiovascular: Negative for chest pain and palpitations.   Gastrointestinal: Positive for abdominal pain, diarrhea and nausea. Negative for blood in stool and vomiting.   Neurological: Negative for dizziness and headaches.       Past Medical History: has a past medical history of IBS (irritable bowel syndrome) and Ulcer.    Past Surgical History: has a past surgical history that includes Hernia repair and braces.    Family History:family history includes Anxiety disorder in his mother; Diabetes in  "his mother; Hypertension in his mother; Prostate cancer in his maternal grandmother.    Allergies: Review of patient's allergies indicates:  No Known Allergies    Social History: reports that he has quit smoking. His smoking use included cigarettes. He has a 0.20 pack-year smoking history. He has never used smokeless tobacco. He reports that he drinks about 3.0 standard drinks of alcohol per week. He reports that he does not use drugs.    Home medications:   Current Outpatient Medications on File Prior to Visit   Medication Sig Dispense Refill    nortriptyline (PAMELOR) 25 MG capsule Take 1 capsule (25 mg total) by mouth every evening. 30 capsule 11     No current facility-administered medications on file prior to visit.        Vital signs:  Ht 5' 2.5" (1.588 m)   Wt 65.1 kg (143 lb 8.3 oz)   BMI 25.83 kg/m²     Physical Exam   Constitutional: He appears well-nourished. No distress.   Eyes: Conjunctivae are normal. No scleral icterus.   Cardiovascular: Normal rate and intact distal pulses.   Pulmonary/Chest: Effort normal. No respiratory distress.   Abdominal: Soft. Bowel sounds are normal. He exhibits no distension and no mass. There is no tenderness. There is no guarding.   Vitals reviewed.      Routine labs:  Lab Results   Component Value Date    WBC 6.87 07/17/2017    HGB 13.6 (L) 07/17/2017    HCT 40.1 07/17/2017    MCV 87 07/17/2017     07/17/2017     No results found for: INR  No results found for: IRON, FERRITIN, TIBC, FESATURATED  Lab Results   Component Value Date     09/10/2018    K 4.3 09/10/2018     09/10/2018    CO2 24 09/10/2018    BUN 16 09/10/2018    CREATININE 1.2 09/10/2018     Lab Results   Component Value Date    ALBUMIN 4.2 09/10/2018    ALT 31 09/10/2018    AST 26 09/10/2018    ALKPHOS 59 09/10/2018    BILITOT 0.6 09/10/2018     No results found for: GLUCOSE    I have reviewed prior labs, imaging, notes from prior GI visit.      Assessment:  1. Irritable bowel syndrome " with diarrhea        Plan:  Orders Placed This Encounter    Celiac Disease Panel    hyoscyamine (LEVSIN/SL) 0.125 mg Subl     Levsin PRN  He requests celiac disease testing   Obtain records from Dr. Gomez.    Also offered trial of Librax, he will consider this in future.    RTC prn    A total of 25 minutes were spent face-to-face with the patient during this encounter and over half of that time was spent on counseling and coordination of care.     Chuck Ambrocio MD  [facility]

## 2020-01-09 LAB
GLIADIN PEPTIDE IGA SER-ACNC: 7 UNITS
GLIADIN PEPTIDE IGG SER-ACNC: 1 UNITS
IGA SERPL-MCNC: 522 MG/DL (ref 70–400)
TTG IGA SER-ACNC: 7 UNITS
TTG IGG SER-ACNC: 3 UNITS

## 2020-01-16 ENCOUNTER — DOCUMENTATION ONLY (OUTPATIENT)
Dept: GASTROENTEROLOGY | Facility: CLINIC | Age: 35
End: 2020-01-16

## 2020-01-16 NOTE — PROGRESS NOTES
Received records from dr. gustavo schuster.    Summary below: (difficult to read handwriting so limited summary)  Rifaximin helped x 2    EGD 2013  Erosive gastropathy  Duodenal erosions    Colon 2013  Normal, biospied    PATH - duo normal  Stomach - reactive gastropathy, no HP  Colon - normal path    Labs reviewed and normal including tsh, crp, [facility] IBD panel negative  Stool studies negative.

## 2023-01-02 ENCOUNTER — OFFICE VISIT (OUTPATIENT)
Dept: URGENT CARE | Facility: CLINIC | Age: 38
End: 2023-01-02
Payer: COMMERCIAL

## 2023-01-02 VITALS
SYSTOLIC BLOOD PRESSURE: 144 MMHG | WEIGHT: 143 LBS | TEMPERATURE: 98 F | RESPIRATION RATE: 16 BRPM | HEIGHT: 62 IN | OXYGEN SATURATION: 98 % | BODY MASS INDEX: 26.31 KG/M2 | HEART RATE: 67 BPM | DIASTOLIC BLOOD PRESSURE: 85 MMHG

## 2023-01-02 DIAGNOSIS — M25.511 ACUTE PAIN OF RIGHT SHOULDER: Primary | ICD-10-CM

## 2023-01-02 PROCEDURE — 99203 OFFICE O/P NEW LOW 30 MIN: CPT | Mod: 25,S$GLB,,

## 2023-01-02 PROCEDURE — 96372 THER/PROPH/DIAG INJ SC/IM: CPT | Mod: S$GLB,,, | Performed by: FAMILY MEDICINE

## 2023-01-02 PROCEDURE — 73030 XR SHOULDER TRAUMA 3 VIEW RIGHT: ICD-10-PCS | Mod: RT,S$GLB,, | Performed by: RADIOLOGY

## 2023-01-02 PROCEDURE — 73030 X-RAY EXAM OF SHOULDER: CPT | Mod: RT,S$GLB,, | Performed by: RADIOLOGY

## 2023-01-02 PROCEDURE — 99203 PR OFFICE/OUTPT VISIT, NEW, LEVL III, 30-44 MIN: ICD-10-PCS | Mod: 25,S$GLB,,

## 2023-01-02 PROCEDURE — 96372 PR INJECTION,THERAP/PROPH/DIAG2ST, IM OR SUBCUT: ICD-10-PCS | Mod: S$GLB,,, | Performed by: FAMILY MEDICINE

## 2023-01-02 RX ORDER — KETOROLAC TROMETHAMINE 30 MG/ML
30 INJECTION, SOLUTION INTRAMUSCULAR; INTRAVENOUS
Status: COMPLETED | OUTPATIENT
Start: 2023-01-02 | End: 2023-01-02

## 2023-01-02 RX ORDER — MELOXICAM 7.5 MG/1
7.5 TABLET ORAL DAILY
Qty: 10 TABLET | Refills: 0 | Status: SHIPPED | OUTPATIENT
Start: 2023-01-02 | End: 2023-01-12

## 2023-01-02 RX ADMIN — KETOROLAC TROMETHAMINE 30 MG: 30 INJECTION, SOLUTION INTRAMUSCULAR; INTRAVENOUS at 01:01

## 2023-01-02 NOTE — PROGRESS NOTES
"Subjective:       Patient ID: Azam Gregory is a 37 y.o. male.    Vitals:  height is 5' 2" (1.575 m) and weight is 64.9 kg (143 lb). His tympanic temperature is 97.6 °F (36.4 °C). His blood pressure is 144/85 (abnormal) and his pulse is 67. His respiration is 16 and oxygen saturation is 98%.     Chief Complaint: Arm Pain (Right arm pain )    37-year-old male presents with complaints of right shoulder pain that began suddenly yesterday.  Patient states that prior to yesterday he had been having a few months of right shoulder pain that was only worse with lying on his right shoulder.  He denies any trauma or injury to the shoulder.  Patient states that the shoulder pain radiates from the right shoulder to the right elbow.  He also complains of hand swelling.  He has taken Tylenol for the symptoms.  No chest pain, shortness a breath or radiating pain.  No numbness or tingling.    Arm Pain   The incident occurred 2 days ago. The incident occurred at home. The injury mechanism is unknown. The pain is present in the upper right arm. The quality of the pain is described as aching and shooting. The pain radiates to the right arm and right hand. The pain is at a severity of 5/10. The pain is moderate. The pain has been Constant since the incident. Pertinent negatives include no chest pain, numbness or tingling. The symptoms are aggravated by movement. He has tried acetaminophen for the symptoms. The treatment provided mild relief.     Cardiovascular:  Negative for chest pain.   Neurological:  Negative for numbness.     Objective:      Physical Exam   Constitutional: He is oriented to person, place, and time. He appears well-developed. He is cooperative.  Non-toxic appearance. He does not appear ill. No distress.   HENT:   Head: Normocephalic and atraumatic.   Ears:   Right Ear: Hearing, tympanic membrane and ear canal normal.   Left Ear: Hearing, tympanic membrane and ear canal normal.   Nose: Nose normal. No mucosal edema or " nasal deformity. No epistaxis. Right sinus exhibits no maxillary sinus tenderness and no frontal sinus tenderness. Left sinus exhibits no maxillary sinus tenderness and no frontal sinus tenderness.   Mouth/Throat: Uvula is midline, oropharynx is clear and moist and mucous membranes are normal. No trismus in the jaw. Normal dentition. No uvula swelling.   Eyes: Lids are normal.   Neck: Trachea normal and phonation normal. Neck supple.   Cardiovascular: Normal rate, regular rhythm, normal heart sounds and normal pulses.   Pulmonary/Chest: Effort normal and breath sounds normal. No respiratory distress.   Abdominal: Normal appearance and bowel sounds are normal. Soft.   Musculoskeletal:         General: No deformity.      Right shoulder: He exhibits decreased range of motion, tenderness and bony tenderness. He exhibits no swelling and no deformity.      Left shoulder: He exhibits no swelling and no deformity.      Comments: Tenderness to palpation of the glenohumeral joint.  Decreased range of motion with overhead movement due to pain.  Patient has pain with active and passive range of motion exercises.  Able to completely perform accurate shoulder assessment.   Neurological: He is alert and oriented to person, place, and time. He exhibits normal muscle tone. Coordination normal.   Skin: Skin is warm, dry, intact, not diaphoretic and not pale.   Psychiatric: His speech is normal and behavior is normal. Judgment and thought content normal.   Nursing note and vitals reviewed.      XR SHOULDER TRAUMA 3 VIEW RIGHT    Result Date: 1/2/2023  EXAMINATION: XR SHOULDER TRAUMA 3 VIEW RIGHT CLINICAL HISTORY: Pain in right shoulder TECHNIQUE: Three or four views of the right shoulder were performed. COMPARISON: None FINDINGS: Curvilinear focus of increased attenuation noted the superior margin of the acromion best appreciated on the Grashey view, measuring on the order of 5-6 mm.  No evidence of dislocation. No acute findings  identified in the visualized portions the chest.     Curvilinear focus of increased attenuation noted the superior margin of the acromion best appreciated on the Grashey view, measuring on the order of 5-6 mm.  While no definite donor site is identified, the possibility of avulsion fracture possibly of the adjoining acromion or distal clavicle difficult to entirely exclude.  Nonspecific periarticular mineralization or calcification additionally considered.  Correlation recommended. Electronically signed by: Kolton Estrella Date:    01/02/2023 Time:    15:04     Assessment:       1. Acute pain of right shoulder          Plan:         Acute pain of right shoulder  -     ketorolac injection 30 mg  -     Cancel: X-Ray Shoulder 2 or More Views Left; Future; Expected date: 01/02/2023  -     XR SHOULDER TRAUMA 3 VIEW RIGHT; Future; Expected date: 01/02/2023  -     meloxicam (MOBIC) 7.5 MG tablet; Take 1 tablet (7.5 mg total) by mouth once daily. for 10 days  Dispense: 10 tablet; Refill: 0           Medical Decision Making:   Initial Assessment:   37-year-old male presents with complaints of right shoulder pain that began suddenly yesterday.  Patient states that prior to yesterday he had been having a few months of right shoulder pain that was only worse with lying on his right shoulder.  He denies any trauma or injury to the shoulder.  Patient states that the shoulder pain radiates from the right shoulder to the right elbow.  He also complains of hand swelling.  He has taken Tylenol for the symptoms.  No chest pain, shortness a breath or radiating pain.  No numbness or tingling.  Differential Diagnosis:   Shoulder dislocation, clavicle fracture, shoulder bursitis, rotator cuff tendinitis      Urgent Care Management:  Patient was given a Toradol injection and x-rays were performed.  X-rays did not show any dislocation.  I was unable to perform an accurate shoulder assessment due to the pain with passive and active range of  motion but does have severe point tenderness to the glenohumeral joint.  After evaluation of the x-ray there was no shoulder dislocation or clavicle fracture that would suspect acute fracture and patient had no trauma or injury to the shoulder.  I do still suspect shoulder bursitis versus rotator cuff tendinitis but unable to perform an accurate assessment does not confirm rotator cuff tendinitis.  Patient was given a Toradol injection in clinic which did improve the patient's pain.  Patient was scheduled for orthopedic appointment tomorrow morning at 10:45 a.m..

## 2023-01-03 ENCOUNTER — OFFICE VISIT (OUTPATIENT)
Dept: SPORTS MEDICINE | Facility: CLINIC | Age: 38
End: 2023-01-03
Payer: COMMERCIAL

## 2023-01-03 VITALS
WEIGHT: 143 LBS | HEIGHT: 62 IN | HEART RATE: 71 BPM | BODY MASS INDEX: 26.31 KG/M2 | DIASTOLIC BLOOD PRESSURE: 83 MMHG | SYSTOLIC BLOOD PRESSURE: 138 MMHG

## 2023-01-03 DIAGNOSIS — R52 MECHANICAL PAIN: ICD-10-CM

## 2023-01-03 DIAGNOSIS — M25.611 DECREASED RIGHT SHOULDER RANGE OF MOTION: Primary | ICD-10-CM

## 2023-01-03 DIAGNOSIS — M12.811 ROTATOR CUFF ARTHROPATHY OF RIGHT SHOULDER: ICD-10-CM

## 2023-01-03 PROCEDURE — 99204 OFFICE O/P NEW MOD 45 MIN: CPT | Mod: S$GLB,,, | Performed by: FAMILY MEDICINE

## 2023-01-03 PROCEDURE — 99204 PR OFFICE/OUTPT VISIT, NEW, LEVL IV, 45-59 MIN: ICD-10-PCS | Mod: S$GLB,,, | Performed by: FAMILY MEDICINE

## 2023-01-03 PROCEDURE — 99999 PR PBB SHADOW E&M-EST. PATIENT-LVL III: ICD-10-PCS | Mod: PBBFAC,,, | Performed by: FAMILY MEDICINE

## 2023-01-03 PROCEDURE — 99999 PR PBB SHADOW E&M-EST. PATIENT-LVL III: CPT | Mod: PBBFAC,,, | Performed by: FAMILY MEDICINE

## 2023-01-03 NOTE — PROGRESS NOTES
Azam Gregory, a 37 y.o. male, is here for evaluation of right shoulder pain.     HISTORY OF PRESENT ILLNESS  Hand dominance, right    Location:  anterior and lateral   Onset:  chronic, insidious  Palliative:     relative rest   oral analgesics, OTC   Toradol IM 1/2/23  Provocative:    glenohumeral ABduction    Prior:  none  Progression:  plateau discomfort  Quality:  sharp  Radiation: none  Severity:  per nursing documentation  Timing:  intermittent with use  Trauma:  none    Review of systems (ROS):  A 10+ review of systems was performed with pertinent positives and negatives noted above in the history of present illness. Other systems were negative unless otherwise specified.    PHYSICAL EXAMINATION  General:  The patient is alert and oriented x 3. Mood is pleasant. Observation of ears, eyes and nose reveal no gross abnormalities. HEENT: NCAT, sclera anicteric. Lungs: Respirations are equal and unlabored.     SHOULDER EXAMINATION     OBSERVATION:     Swelling  none  Deformity  none   Discoloration  none   Scapular winging none   Scars   none  Atrophy  none    TENDERNESS / CREPITUS (T/C):          T/C      T/C   Clavicle   -/-  SUPRAspinatus    -/-     AC Jt.    -/-  INFRAspinatus  -/-    SC Jt.    -/-  Deltoid    -/-      G. Tuberosity  -/-  LH BICEP groove  -/-   Acromion:  -/-  Midline Neck   -/-     Scapular Spine -/-  Trapezium   -/-   SMA Scapula  -/-  GH jt. line - post  -/-     Scapulothoracic  -/-         ROM:     Right shoulder   Left shoulder        AROM (PROM)   AROM (PROM)   FE    170° (175°)     170° (175°)     ER at 0°    60°  (65°)    60°  (65°)   ER at 90° ABD  90°  (90°)    90°  (90°)   IR at 90°  ABD   NA  (40°)     NA  (40°)      IR (spine level)   T10     T10    STRENGTH: (* = with pain) RIGHT SHOULDER  LEFT SHOULDER   SCAPTION   5/5    5/5    IR    5/5    5/5   ER    5/5    5/5   BICEPS   5/5    5/5   Deltoid    5/5    5/5     SIGNS:  Painful side       JANICE OGDEN    -   SPEEDS        -   DROP ARM   -   BELLY PRESS       -    X-Body ADD    -   LIFT-OFF        -   HORNBLOWERS      -              STABILITY TESTING   RIGHT SHOULDER  LEFT SHOULDER     Translation     Anterior up face    up face    Posterior up face   up face    Sulcus  < 10mm   < 10 mm     Signs   Apprehension   neg     neg       Relocation   no change    no change      Jerk test  neg    neg    EXTREMITY NEURO-VASCULAR EXAM    Sensation grossly intact to light touch all dermatomal regions.    DTR 2+ Biceps, Triceps, BR and Negative Kriss sign   Grossly intact motor function at Elbow, Wrist and Hand   Distal pulses radial and ulnar 2+, brisk cap refill, symmetric.      NECK:  Painless FROM and spinous processes non-tender. Negative Spurlings sign.       Other Findings:    ASSESSMENT & PLAN   Assessment  #1 suspected tearing of supraspinatus tendon, right    No evidence of neurologic pathology  No evidence of vascular pathology    Imaging studies reviewed:   X-ray shoulder, right 23.01    Plan  Given extreme dysfunction and discomfort, coupled with physical examination suggesting suprior rotator cuff pathology, and with non-diagnostic x-ray imaging, we will obtain MRI imaging for further evaluation of the soft tissue structures of the shoulder.    We discussed options including    Watchful waiting / relative rest x   Physical therapy    Injection therapy    Consultation    The patient chooses As above   x = prescribed  CSI = corticosteroid injection  VSI = viscosupplement injection  PRPI = platelet rich plasma injection  ia = intra articular  R = right  L = left  B = bilateral   nfSx = surgical consultation was recommended, but patient is not interested in consultation at this time    Physical Therapy        Formal (fPT), @ [facility] facility    Formal (fPT), @ [facility] facility        Homegoing (hgPT), per concurrent fPT recommendations    Homegoing (hgPT), per prior fPT recommendations    Homegoing (hgPT), handout  provided        w/  (atPT)    [blank] = not prescribed  x = prescribed  b = prescribed, and begin as indicated  t = continue as indicated  r = prescribed, and restart as indicated  p = completed prior as indicated  hs = prescribed, and with high school   col = prescribed, and with college or university   nfPT = physical therapy was recommended, but patient is not interested in PT at this time    Activity (e.g. sports, work) restrictions    [blank] = as tolerated  pt = per physical therapist  at = per   NWB = non weight bearing on affected lower extremity, with crutches assistance for ambulation    Bracing Shoulder sling, discussed, deferred by pt   [blank] = not prescribed  r = recommended, but not fit with at todays visit  f = prescribed and fit with at todays visit  t = continue as indicated  d = d/c  p = as needed  rare = use on rare, as-needed basis; advised against chronic use    Pain management    [blank] = No prescription necessary. A handout detailing dosing of appropriate   over-the-counter musculoskeletal analgesics was made available to the patient.   m = meloxicam x 14 days  mp = 14 day course of meloxicam prescribed prior    Follow up MRI   [blank] = as needed  [number] = in [number] weeks  CSI = for corticosteroid injection  VSI = for viscosupplement injection or injection series  PRP = for platelet rich plasma injection or injection series  MRI = after MRI imaging  ns = should surgical options be deferred (no surgery)  o = appointment offered, deferred by patient    Should symptoms worsen or fail to resolve, consider    Revisiting the above options and / or      Vocation:    IT for [employer]

## 2023-01-06 ENCOUNTER — TELEPHONE (OUTPATIENT)
Dept: SPORTS MEDICINE | Facility: CLINIC | Age: 38
End: 2023-01-06
Payer: COMMERCIAL

## 2023-01-06 NOTE — TELEPHONE ENCOUNTER
Called and left voicemail for patient to inform him that his MRI has been denied by his insurance company.  I further informed patient that his MRI appointment and follow up appointment with be cancelled until Dr. Hill completes the peer to peer and we get authorization from insurance to move forward with MRI.

## 2023-06-08 ENCOUNTER — OFFICE VISIT (OUTPATIENT)
Dept: FAMILY MEDICINE | Facility: CLINIC | Age: 38
End: 2023-06-08
Payer: COMMERCIAL

## 2023-06-08 ENCOUNTER — LAB VISIT (OUTPATIENT)
Dept: LAB | Facility: HOSPITAL | Age: 38
End: 2023-06-08
Attending: FAMILY MEDICINE
Payer: COMMERCIAL

## 2023-06-08 VITALS
TEMPERATURE: 98 F | WEIGHT: 153.44 LBS | HEART RATE: 63 BPM | DIASTOLIC BLOOD PRESSURE: 72 MMHG | SYSTOLIC BLOOD PRESSURE: 128 MMHG | BODY MASS INDEX: 28.24 KG/M2 | HEIGHT: 62 IN | OXYGEN SATURATION: 99 % | RESPIRATION RATE: 18 BRPM

## 2023-06-08 DIAGNOSIS — K58.0 IRRITABLE BOWEL SYNDROME WITH DIARRHEA: ICD-10-CM

## 2023-06-08 DIAGNOSIS — F41.9 ANXIETY: ICD-10-CM

## 2023-06-08 DIAGNOSIS — Z00.00 ANNUAL PHYSICAL EXAM: ICD-10-CM

## 2023-06-08 DIAGNOSIS — G47.33 OSA ON CPAP: ICD-10-CM

## 2023-06-08 DIAGNOSIS — Z00.00 ANNUAL PHYSICAL EXAM: Primary | ICD-10-CM

## 2023-06-08 LAB
BASOPHILS # BLD AUTO: 0.02 K/UL (ref 0–0.2)
BASOPHILS NFR BLD: 0.3 % (ref 0–1.9)
DIFFERENTIAL METHOD: ABNORMAL
EOSINOPHIL # BLD AUTO: 0.1 K/UL (ref 0–0.5)
EOSINOPHIL NFR BLD: 0.9 % (ref 0–8)
ERYTHROCYTE [DISTWIDTH] IN BLOOD BY AUTOMATED COUNT: 14.6 % (ref 11.5–14.5)
HCT VFR BLD AUTO: 42.3 % (ref 40–54)
HGB BLD-MCNC: 13.7 G/DL (ref 14–18)
IMM GRANULOCYTES # BLD AUTO: 0.02 K/UL (ref 0–0.04)
IMM GRANULOCYTES NFR BLD AUTO: 0.3 % (ref 0–0.5)
LYMPHOCYTES # BLD AUTO: 3 K/UL (ref 1–4.8)
LYMPHOCYTES NFR BLD: 45.9 % (ref 18–48)
MCH RBC QN AUTO: 28.7 PG (ref 27–31)
MCHC RBC AUTO-ENTMCNC: 32.4 G/DL (ref 32–36)
MCV RBC AUTO: 89 FL (ref 82–98)
MONOCYTES # BLD AUTO: 0.5 K/UL (ref 0.3–1)
MONOCYTES NFR BLD: 7.7 % (ref 4–15)
NEUTROPHILS # BLD AUTO: 2.9 K/UL (ref 1.8–7.7)
NEUTROPHILS NFR BLD: 44.9 % (ref 38–73)
NRBC BLD-RTO: 0 /100 WBC
PLATELET # BLD AUTO: 254 K/UL (ref 150–450)
PMV BLD AUTO: 11.1 FL (ref 9.2–12.9)
RBC # BLD AUTO: 4.78 M/UL (ref 4.6–6.2)
WBC # BLD AUTO: 6.52 K/UL (ref 3.9–12.7)

## 2023-06-08 PROCEDURE — 36415 COLL VENOUS BLD VENIPUNCTURE: CPT | Mod: PO | Performed by: FAMILY MEDICINE

## 2023-06-08 PROCEDURE — 99385 PREV VISIT NEW AGE 18-39: CPT | Mod: S$GLB,,, | Performed by: FAMILY MEDICINE

## 2023-06-08 PROCEDURE — 80053 COMPREHEN METABOLIC PANEL: CPT | Performed by: FAMILY MEDICINE

## 2023-06-08 PROCEDURE — 84443 ASSAY THYROID STIM HORMONE: CPT | Performed by: FAMILY MEDICINE

## 2023-06-08 PROCEDURE — 99385 PR PREVENTIVE VISIT,NEW,18-39: ICD-10-PCS | Mod: S$GLB,,, | Performed by: FAMILY MEDICINE

## 2023-06-08 PROCEDURE — 99999 PR PBB SHADOW E&M-EST. PATIENT-LVL IV: ICD-10-PCS | Mod: PBBFAC,,, | Performed by: FAMILY MEDICINE

## 2023-06-08 PROCEDURE — 85025 COMPLETE CBC W/AUTO DIFF WBC: CPT | Performed by: FAMILY MEDICINE

## 2023-06-08 PROCEDURE — 99999 PR PBB SHADOW E&M-EST. PATIENT-LVL IV: CPT | Mod: PBBFAC,,, | Performed by: FAMILY MEDICINE

## 2023-06-08 PROCEDURE — 83036 HEMOGLOBIN GLYCOSYLATED A1C: CPT | Performed by: FAMILY MEDICINE

## 2023-06-08 PROCEDURE — 80061 LIPID PANEL: CPT | Performed by: FAMILY MEDICINE

## 2023-06-08 RX ORDER — AMITRIPTYLINE HYDROCHLORIDE 10 MG/1
10 TABLET, FILM COATED ORAL NIGHTLY PRN
Qty: 30 TABLET | Refills: 2 | Status: SHIPPED | OUTPATIENT
Start: 2023-06-08 | End: 2023-08-30 | Stop reason: SDUPTHER

## 2023-06-08 RX ORDER — HYOSCYAMINE SULFATE 0.12 MG/1
0.12 TABLET SUBLINGUAL EVERY 6 HOURS PRN
Qty: 60 TABLET | Refills: 2 | Status: SHIPPED | OUTPATIENT
Start: 2023-06-08

## 2023-06-08 NOTE — PROGRESS NOTES
"Subjective:       Patient ID: Azam Gregory is a 37 y.o. male.    Chief Complaint: Annual Exam      HPI  37-year-old male presents for annual exam.  States he has issues with anxiety.  States he does not have the ability to go to restroom to have BM he gets the urge.  Was seen by GI was diagnosed with IBS.    Review of Systems   Constitutional: Negative.    HENT: Negative.     Respiratory: Negative.     Cardiovascular: Negative.    Gastrointestinal: Negative.    Endocrine: Negative.    Genitourinary: Negative.    Musculoskeletal: Negative.    Neurological: Negative.    Psychiatric/Behavioral: Negative.          Past Medical History:   Diagnosis Date    IBS (irritable bowel syndrome)     Ulcer      Past Surgical History:   Procedure Laterality Date    braces      HERNIA REPAIR       Family History   Problem Relation Age of Onset    Hypertension Mother     Diabetes Mother     Anxiety disorder Mother     Prostate cancer Maternal Grandmother      Social History     Socioeconomic History    Marital status: Single   Tobacco Use    Smoking status: Former     Packs/day: 0.10     Years: 2.00     Pack years: 0.20     Types: Cigarettes    Smokeless tobacco: Never   Substance and Sexual Activity    Alcohol use: Yes     Alcohol/week: 3.0 standard drinks     Types: 3 Standard drinks or equivalent per week     Comment: 3 drinks weekly    Drug use: No       Current Outpatient Medications:     amitriptyline (ELAVIL) 10 MG tablet, Take 1 tablet (10 mg total) by mouth nightly as needed for Insomnia., Disp: 30 tablet, Rfl: 2    hyoscyamine (LEVSIN/SL) 0.125 mg Subl, Place 1 tablet (0.125 mg total) under the tongue every 6 (six) hours as needed (gi upset)., Disp: 60 tablet, Rfl: 2   Objective:      Vitals:    06/08/23 1434   BP: 128/72   BP Location: Left arm   Patient Position: Sitting   BP Method: Small (Manual)   Pulse: 63   Resp: 18   Temp: 97.8 °F (36.6 °C)   TempSrc: Oral   SpO2: 99%   Weight: 69.6 kg (153 lb 7 oz)   Height: 5' 2" " (1.575 m)       Physical Exam  Constitutional:       General: He is not in acute distress.  HENT:      Head: Normocephalic and atraumatic.   Eyes:      Conjunctiva/sclera: Conjunctivae normal.   Cardiovascular:      Rate and Rhythm: Normal rate and regular rhythm.      Heart sounds: Normal heart sounds. No murmur heard.    No friction rub. No gallop.   Pulmonary:      Effort: Pulmonary effort is normal.      Breath sounds: Normal breath sounds. No wheezing or rales.   Musculoskeletal:      Cervical back: Neck supple.   Skin:     General: Skin is warm and dry.   Neurological:      Mental Status: He is alert and oriented to person, place, and time.   Psychiatric:         Behavior: Behavior normal.         Thought Content: Thought content normal.         Judgment: Judgment normal.          Assessment:       1. Annual physical exam    2. Irritable bowel syndrome with diarrhea    3. Anxiety    4. DERIC on CPAP        Plan:       Annual physical exam  -     CBC Auto Differential; Future; Expected date: 06/08/2023  -     Comprehensive Metabolic Panel; Future; Expected date: 06/08/2023  -     Hemoglobin A1C; Future; Expected date: 06/08/2023  -     TSH; Future; Expected date: 06/08/2023  -     Lipid Panel; Future; Expected date: 06/08/2023    Irritable bowel syndrome with diarrhea  -     hyoscyamine (LEVSIN/SL) 0.125 mg Subl; Place 1 tablet (0.125 mg total) under the tongue every 6 (six) hours as needed (gi upset).  Dispense: 60 tablet; Refill: 2  -     Ambulatory referral/consult to Gastroenterology; Future; Expected date: 06/15/2023  -     amitriptyline (ELAVIL) 10 MG tablet; Take 1 tablet (10 mg total) by mouth nightly as needed for Insomnia.  Dispense: 30 tablet; Refill: 2    Anxiety  -     amitriptyline (ELAVIL) 10 MG tablet; Take 1 tablet (10 mg total) by mouth nightly as needed for Insomnia.  Dispense: 30 tablet; Refill: 2    DERIC on CPAP      Follow-up labs.  Will try Elavil.  Refer patient to GI.        Future  Appointments   Date Time Provider Department Center   6/14/2023  1:00 PM Phil Bardales MD [facility]       Patient note was created using [employer]gar.  Any errors in syntax or even information may not have been identified and edited on initial review prior to signing this note.

## 2023-06-08 NOTE — PROGRESS NOTES
Health Maintenance Due   Topic     COVID-19 Vaccine (1) Not offered at this office    TETANUS VACCINE  Consult pcp    Hemoglobin A1c (Diabetic Prevention Screening)  Consult pcp    Lipid Panel  Consult pcp

## 2023-06-09 LAB
ALBUMIN SERPL BCP-MCNC: 4.3 G/DL (ref 3.5–5.2)
ALP SERPL-CCNC: 66 U/L (ref 55–135)
ALT SERPL W/O P-5'-P-CCNC: 37 U/L (ref 10–44)
ANION GAP SERPL CALC-SCNC: 8 MMOL/L (ref 8–16)
AST SERPL-CCNC: 26 U/L (ref 10–40)
BILIRUB SERPL-MCNC: 0.4 MG/DL (ref 0.1–1)
BUN SERPL-MCNC: 13 MG/DL (ref 6–20)
CALCIUM SERPL-MCNC: 9.3 MG/DL (ref 8.7–10.5)
CHLORIDE SERPL-SCNC: 102 MMOL/L (ref 95–110)
CHOLEST SERPL-MCNC: 161 MG/DL (ref 120–199)
CHOLEST/HDLC SERPL: 3.4 {RATIO} (ref 2–5)
CO2 SERPL-SCNC: 26 MMOL/L (ref 23–29)
CREAT SERPL-MCNC: 1.3 MG/DL (ref 0.5–1.4)
EST. GFR  (NO RACE VARIABLE): >60 ML/MIN/1.73 M^2
ESTIMATED AVG GLUCOSE: 114 MG/DL (ref 68–131)
GLUCOSE SERPL-MCNC: 85 MG/DL (ref 70–110)
HBA1C MFR BLD: 5.6 % (ref 4–5.6)
HDLC SERPL-MCNC: 47 MG/DL (ref 40–75)
HDLC SERPL: 29.2 % (ref 20–50)
LDLC SERPL CALC-MCNC: 98 MG/DL (ref 63–159)
NONHDLC SERPL-MCNC: 114 MG/DL
POTASSIUM SERPL-SCNC: 4.1 MMOL/L (ref 3.5–5.1)
PROT SERPL-MCNC: 8 G/DL (ref 6–8.4)
SODIUM SERPL-SCNC: 136 MMOL/L (ref 136–145)
TRIGL SERPL-MCNC: 80 MG/DL (ref 30–150)
TSH SERPL DL<=0.005 MIU/L-ACNC: 2.23 UIU/ML (ref 0.4–4)

## 2023-06-14 ENCOUNTER — OFFICE VISIT (OUTPATIENT)
Dept: GASTROENTEROLOGY | Facility: CLINIC | Age: 38
End: 2023-06-14
Payer: COMMERCIAL

## 2023-06-14 ENCOUNTER — LAB VISIT (OUTPATIENT)
Dept: LAB | Facility: HOSPITAL | Age: 38
End: 2023-06-14
Attending: INTERNAL MEDICINE
Payer: COMMERCIAL

## 2023-06-14 VITALS
SYSTOLIC BLOOD PRESSURE: 149 MMHG | DIASTOLIC BLOOD PRESSURE: 82 MMHG | HEART RATE: 68 BPM | HEIGHT: 62 IN | WEIGHT: 157 LBS | BODY MASS INDEX: 28.89 KG/M2

## 2023-06-14 DIAGNOSIS — D64.9 LOW HEMOGLOBIN: ICD-10-CM

## 2023-06-14 DIAGNOSIS — D64.9 LOW HEMATOCRIT: Primary | ICD-10-CM

## 2023-06-14 DIAGNOSIS — K58.0 IRRITABLE BOWEL SYNDROME WITH DIARRHEA: ICD-10-CM

## 2023-06-14 LAB
FERRITIN SERPL-MCNC: 693 NG/ML (ref 20–300)
HAV IGG SER QL IA: NORMAL
HAV IGM SERPL QL IA: NORMAL
HBV CORE IGM SERPL QL IA: NORMAL
HBV SURFACE AG SERPL QL IA: NORMAL
HCV AB SERPL QL IA: NORMAL
HGB BLD-MCNC: 13.3 G/DL (ref 14–18)
IGA SERPL-MCNC: 482 MG/DL (ref 40–350)
IRON SERPL-MCNC: 74 UG/DL (ref 45–160)
LIPASE SERPL-CCNC: 17 U/L (ref 4–60)
SATURATED IRON: 22 % (ref 20–50)
TOTAL IRON BINDING CAPACITY: 332 UG/DL (ref 250–450)
TRANSFERRIN SERPL-MCNC: 224 MG/DL (ref 200–375)

## 2023-06-14 PROCEDURE — 85018 HEMOGLOBIN: CPT | Performed by: INTERNAL MEDICINE

## 2023-06-14 PROCEDURE — 36415 COLL VENOUS BLD VENIPUNCTURE: CPT | Performed by: INTERNAL MEDICINE

## 2023-06-14 PROCEDURE — 86790 VIRUS ANTIBODY NOS: CPT | Performed by: INTERNAL MEDICINE

## 2023-06-14 PROCEDURE — 86706 HEP B SURFACE ANTIBODY: CPT | Performed by: INTERNAL MEDICINE

## 2023-06-14 PROCEDURE — 82784 ASSAY IGA/IGD/IGG/IGM EACH: CPT | Performed by: INTERNAL MEDICINE

## 2023-06-14 PROCEDURE — 80074 ACUTE HEPATITIS PANEL: CPT | Performed by: INTERNAL MEDICINE

## 2023-06-14 PROCEDURE — 86364 TISS TRNSGLTMNASE EA IG CLAS: CPT | Performed by: INTERNAL MEDICINE

## 2023-06-14 PROCEDURE — 84466 ASSAY OF TRANSFERRIN: CPT | Performed by: INTERNAL MEDICINE

## 2023-06-14 PROCEDURE — 86753 PROTOZOA ANTIBODY NOS: CPT | Performed by: INTERNAL MEDICINE

## 2023-06-14 PROCEDURE — 99999 PR PBB SHADOW E&M-EST. PATIENT-LVL III: ICD-10-PCS | Mod: PBBFAC,,, | Performed by: INTERNAL MEDICINE

## 2023-06-14 PROCEDURE — 82728 ASSAY OF FERRITIN: CPT | Performed by: INTERNAL MEDICINE

## 2023-06-14 PROCEDURE — 99999 PR PBB SHADOW E&M-EST. PATIENT-LVL III: CPT | Mod: PBBFAC,,, | Performed by: INTERNAL MEDICINE

## 2023-06-14 PROCEDURE — 99204 OFFICE O/P NEW MOD 45 MIN: CPT | Mod: S$GLB,,, | Performed by: INTERNAL MEDICINE

## 2023-06-14 PROCEDURE — 83690 ASSAY OF LIPASE: CPT | Performed by: INTERNAL MEDICINE

## 2023-06-14 PROCEDURE — 86682 HELMINTH ANTIBODY: CPT | Performed by: INTERNAL MEDICINE

## 2023-06-14 PROCEDURE — 99204 PR OFFICE/OUTPT VISIT, NEW, LEVL IV, 45-59 MIN: ICD-10-PCS | Mod: S$GLB,,, | Performed by: INTERNAL MEDICINE

## 2023-06-14 NOTE — PROGRESS NOTES
"GENERAL GI PATIENT INTAKE:    COVID symptoms in the last 7 days (runny nose, sore throat, congestion, cough, fever): No  PCP: Casey Arana  If not PCP-  number given to establish [phone]: No    ALLERGIES REVIEWED:  Yes    CHIEF COMPLAINT:    Chief Complaint   Patient presents with    GI Problem       VITAL SIGNS:  BP (!) 149/82   Pulse 68   Ht 5' 2" (1.575 m)   Wt 71.2 kg (157 lb)   BMI 28.72 kg/m²      Change in medical, surgical, family or social history: No      REVIEWED MEDICATION LIST RECONCILED INCLUDING ABOVE MEDS:  Yes     "

## 2023-06-14 NOTE — PROGRESS NOTES
[facility] Consultation Note    Reason for Consult:  The primary encounter diagnosis was Low hematocrit. Diagnoses of Irritable bowel syndrome with diarrhea and Low hemoglobin were also pertinent to this visit.    PCP:   Casey Arana   [address] / [facility] [address]    Referring MD:  Casey Arana Md  [address] [place] [address]    Initial History of Present Illness (HPI):  This is a 37 y.o. male here for evaluation of  IBS GI symptoms.  Patient says ever since he was a young boy when he got nervous he would have to have a bowel movement he says he does not have diarrhea his stools or formed no blood no oral no grease has somewhere between 2 and 3 bowel movements a day but it is usually when he gets nervous or he has to be some where he feels like he has to go to the bathroom and then he feels like he has incomplete evacuation has to stay in the bathroom for a prolonged period of time he was recently started on Elavil 10 mg at night he is only been on it for less than a week and he says thinks been some improvement already we did talk to him about the possibility of taking Metamucil a tbsp in 8 oz of water either once or twice daily to see if this would help him have a more complete evacuation with each bowel movement therefore less likely feel the urge to have a bowel movement when he has to go somewhere or travel.  No family history of any GI malignancies no colon cancer nobody with advanced colon adenomas polyps no FAP no attenuated FAP no MA P no Rivas syndrome nobody with celiac sprue or inflammatory bowel disease thinks he could possibly have lactase deficiency he thinks his mom does nobody with pancreatitis or pancreatic cancer nobody with liver disease or liver cancer nobody with stomach cancer or esophageal cancer he does not have early satiety no abdominal pain no dysphagia no odynophagia no heartburn no overt GI bleeding no melena no  hematochezia.  No change in stool form shape or consistency or frequency he is slightly anemic on recent blood work so will repeat lab work today including iron TIBC and hemoglobin.  No weight loss    Abdominal pain - no  Reflux - no  Dysphagia - no   Bowel habits - normal  GI bleeding - none  NSAID usage - none    Interval HPI 06/14/2023:  The patient's last visit with me was on Visit date not found.      ROS:  Constitutional: No fevers, chills, No weight loss  ENT:  No heartburn no dysphagia no odynophagia no hoarseness  CV: No chest pain, no palpitation  Pulm: No cough, No shortness of breath, no wheezing  Ophtho: No vision changes  GI: see HPI  Derm: No rash, no itching  Heme: No lymphadenopathy, No easy bruising  MSK: No significant arthritis  : No dysuria, No hematuria  Endo: No hot or cold intolerance  Neuro: No syncope, No seizure, no strokes  Psych: No uncontrolled anxiety, No uncontrolled depression    Medical History:  has a past medical history of IBS (irritable bowel syndrome) and Ulcer.    Surgical History:  has a past surgical history that includes Hernia repair and braces.    Family History: family history includes Anxiety disorder in his mother; Diabetes in his mother; Hypertension in his mother; Prostate cancer in his maternal grandmother..     Social History:  reports that he has quit smoking. His smoking use included cigarettes. He has a 0.20 pack-year smoking history. He has never used smokeless tobacco. He reports current alcohol use of about 3.0 standard drinks per week. He reports that he does not use drugs.    Review of patient's allergies indicates:  No Known Allergies    Medication List with Changes/Refills   Current Medications    AMITRIPTYLINE (ELAVIL) 10 MG TABLET    Take 1 tablet (10 mg total) by mouth nightly as needed for Insomnia.    HYOSCYAMINE (LEVSIN/SL) 0.125 MG SUBL    Place 1 tablet (0.125 mg total) under the tongue every 6 (six) hours as needed (gi upset).         Objective  "Findings:    Vital Signs:  BP (!) 149/82   Pulse 68   Ht 5' 2" (1.575 m)   Wt 71.2 kg (157 lb)   BMI 28.72 kg/m²   Body mass index is 28.72 kg/m².    Physical Exam:  General Appearance: Well appearing in no acute distress  Eyes:    No scleral icterus  ENT:  No lesions or masses   Lungs: CTA bilaterally, no wheezes, no rhonchi, no rales  Heart:  S1, S2 normal, no murmurs heard  Abdomen:  Non distended, soft, no guarding, no rebound, no tenderness, no appreciated ascites, no bruits, no hepatosplenomegaly,  No CVA tenderness, no appreciated hernias, no Vigil sign, no McBurney point tenderness  Musculoskeletal:  No major joint deformities  Skin: No petechiae or rash on exposed skin areas  Neurologic:  Alert and oriented x4  Psychiatric:  Normal speech mentation and affect    Labs:  Lab Results   Component Value Date    WBC 6.52 06/08/2023    HGB 13.7 (L) 06/08/2023    HCT 42.3 06/08/2023     06/08/2023    CHOL 161 06/08/2023    TRIG 80 06/08/2023    HDL 47 06/08/2023    ALT 37 06/08/2023    AST 26 06/08/2023     06/08/2023    K 4.1 06/08/2023     06/08/2023    CREATININE 1.3 06/08/2023    BUN 13 06/08/2023    CO2 26 06/08/2023    TSH 2.229 06/08/2023    HGBA1C 5.6 06/08/2023             Medical Decision Making:  Lab work reviewed  Repeat lab work talk given  Stool studies talk given  Potential for EGD colonoscopy talk given      Assessment:  1. Low hematocrit    2. Irritable bowel syndrome with diarrhea    3. Low hemoglobin         Recommendations:  1.   Lab work today [address] [place] [address]  2. Stool studies to be turn in [facility] floor [address] [place] with a fresh stool sample turned in within 4 hours of collection Monday through Friday  3. Return GI clinic 2-3 months for follow-up    Follow up in about 3 months (around 9/14/2023).      Order summary:  Orders Placed This Encounter    Clostridium difficile EIA    Stool culture    Hepatitis Panel, Acute    Hepatitis A antibody, IgG    Hepatitis B Surface " Antibody, Qual/Quant    Lipase    TISSUE TRANSGLUTAMINASE (TTG), IGA    IgA    Strongyloides IgG Antibodies    Anti-Ent. Histolytica Ab    Micropsoridia species, PCR    Cyclospora    Pancreatic elastase, fecal    Fecal fat, qualitative    Giardia / Cryptosporidum, EIA    Stool Exam-Ova,Cysts,Parasites    H. pylori antigen, stool    Iron and TIBC    Ferritin    Hemoglobin         Thank you so much for allowing me to participate in the care of Azam Gregory    Phil Bardales MD    DISCLAIMER: This note was prepared with [employer] voice recognition transcription software. Garbled syntax, mangled or inadvertent pronouns, and other bizarre constructions may be attributed to that software system. While efforts were made to correct any mistakes made by this voice recognition program, some errors and/or omissions may remain in the note that were missed when the note was originally created.

## 2023-06-14 NOTE — Clinical Note
Ann Mraie I added some extra blood work on today just a 2nd ago so want to disturb remind you a link it this is lab work for today 2nd floor  Stool studies to be turned in 2nd floor [facility] [place]  Return GI clinic 8-12 weeks telemedicine video visit  Thank you very much

## 2023-06-15 LAB
E HISTOLYT AB SER QL: NEGATIVE
STRONGYLOIDES ANTIBODY IGG: NEGATIVE

## 2023-06-16 LAB
HBV SURFACE AB SER QL IA: NEGATIVE
HBV SURFACE AB SERPL IA-ACNC: 5 MIU/ML
TTG IGA SER-ACNC: 1.3 U/ML

## 2023-07-09 ENCOUNTER — PATIENT MESSAGE (OUTPATIENT)
Dept: GASTROENTEROLOGY | Facility: CLINIC | Age: 38
End: 2023-07-09
Payer: COMMERCIAL

## 2023-07-09 DIAGNOSIS — Z23 ENCOUNTER FOR VACCINATION: Primary | ICD-10-CM

## 2023-07-09 NOTE — PROGRESS NOTES
"GI MA team - please tell patient that their Hepatitis A, B and C labs are negative but they have "No" immunity to them either.     There is currently No vaccination yet for Hepatitis C.    There is vaccinations for Hepatitis A and B,  and Recommend the Hepatitis A and B vaccination series.       Hepatitis A vaccination series is a 2 part vaccine series - Day 1, and then again in 6-months from first one.    Hepatitis B vaccination series is a 2 part vaccine series - Day 1, and then again in 1-month from the first one.    Orders were  placed. "

## 2023-07-13 ENCOUNTER — TELEPHONE (OUTPATIENT)
Dept: ENDOSCOPY | Facility: HOSPITAL | Age: 38
End: 2023-07-13
Payer: COMMERCIAL

## 2023-08-26 DIAGNOSIS — K58.0 IRRITABLE BOWEL SYNDROME WITH DIARRHEA: ICD-10-CM

## 2023-08-26 DIAGNOSIS — F41.9 ANXIETY: ICD-10-CM

## 2023-08-26 NOTE — TELEPHONE ENCOUNTER
No care due was identified.  [facility] Embedded Care Due Messages. Reference number: [id number].   8/26/2023 8:09:14 AM CDT

## 2023-08-27 NOTE — TELEPHONE ENCOUNTER
Refill Routing Note   Medication(s) are not appropriate for processing by [facility] for the following reason(s):      New or recently adjusted medication    [facility] action(s):  Defer Care Due:  None identified            Appointments  past 12m or future 3m with PCP    Date Provider   Last Visit   6/8/2023 Casey Arana MD   Next Visit   Visit date not found Casey Arana MD   ED visits in past 90 days: 0        Note composed:4:11 AM 08/27/2023

## 2023-08-30 RX ORDER — AMITRIPTYLINE HYDROCHLORIDE 10 MG/1
10 TABLET, FILM COATED ORAL NIGHTLY PRN
Qty: 30 TABLET | Refills: 2 | Status: SHIPPED | OUTPATIENT
Start: 2023-08-30 | End: 2023-12-26 | Stop reason: SDUPTHER

## 2023-12-26 DIAGNOSIS — K58.0 IRRITABLE BOWEL SYNDROME WITH DIARRHEA: ICD-10-CM

## 2023-12-26 DIAGNOSIS — F41.9 ANXIETY: ICD-10-CM

## 2023-12-26 NOTE — TELEPHONE ENCOUNTER
No care due was identified.  Health [facility] Embedded Care Due Messages. Reference number: [id number].   12/26/2023 2:04:15 PM CST

## 2023-12-27 RX ORDER — AMITRIPTYLINE HYDROCHLORIDE 10 MG/1
10 TABLET, FILM COATED ORAL NIGHTLY PRN
Qty: 30 TABLET | Refills: 2 | Status: SHIPPED | OUTPATIENT
Start: 2023-12-27

## 2024-04-20 DIAGNOSIS — F41.9 ANXIETY: ICD-10-CM

## 2024-04-20 DIAGNOSIS — K58.0 IRRITABLE BOWEL SYNDROME WITH DIARRHEA: ICD-10-CM

## 2024-04-20 NOTE — TELEPHONE ENCOUNTER
Care Due:                  Date            Visit Type   Department     Provider  --------------------------------------------------------------------------------                                NP -                              PRIMARY      [facility]  Last Visit: 06-      CARE ([facility])   MEDICINE       Casey Arana  Next Visit: None Scheduled  None         None Found                                                            Last  Test          Frequency    Reason                     Performed    Due Date  --------------------------------------------------------------------------------    Office Visit  12 months..  hyoscyamine..............  06- 06-    Health [facility] Embedded Care Due Messages. Reference number: [id number].   4/20/2024 8:09:13 AM CDT

## 2024-04-21 RX ORDER — AMITRIPTYLINE HYDROCHLORIDE 10 MG/1
10 TABLET, FILM COATED ORAL NIGHTLY PRN
Qty: 90 TABLET | Refills: 0 | Status: SHIPPED | OUTPATIENT
Start: 2024-04-21

## 2024-04-21 NOTE — TELEPHONE ENCOUNTER
Provider Staff:  Action required for this patient     Please see care gap opportunities below in Care Due Message.    Thanks!  [facility]     Appointments      Date Provider   Last Visit   6/8/2023 Casey Arana MD   Next Visit   Visit date not found Casey Arana MD      Refill Decision Note   Azam Gregory  is requesting a refill authorization.  Brief Assessment and Rationale for Refill:  Approve     Medication Therapy Plan:         Comments:     Note composed:5:03 AM 04/21/2024

## 2025-02-25 ENCOUNTER — OFFICE VISIT (OUTPATIENT)
Dept: FAMILY MEDICINE | Facility: CLINIC | Age: 40
End: 2025-02-25
Payer: COMMERCIAL

## 2025-02-25 VITALS
SYSTOLIC BLOOD PRESSURE: 118 MMHG | OXYGEN SATURATION: 95 % | RESPIRATION RATE: 16 BRPM | DIASTOLIC BLOOD PRESSURE: 70 MMHG | WEIGHT: 166 LBS | HEART RATE: 74 BPM | BODY MASS INDEX: 30.55 KG/M2 | HEIGHT: 62 IN | TEMPERATURE: 98 F

## 2025-02-25 DIAGNOSIS — K30 UPSET STOMACH: ICD-10-CM

## 2025-02-25 DIAGNOSIS — Z51.89 THERAPY: ICD-10-CM

## 2025-02-25 DIAGNOSIS — Z13.1 SCREENING FOR DIABETES MELLITUS: ICD-10-CM

## 2025-02-25 DIAGNOSIS — F41.9 ANXIOUSNESS: ICD-10-CM

## 2025-02-25 DIAGNOSIS — Z11.3 SCREEN FOR STD (SEXUALLY TRANSMITTED DISEASE): ICD-10-CM

## 2025-02-25 DIAGNOSIS — Z23 NEED FOR COVID-19 VACCINE: ICD-10-CM

## 2025-02-25 DIAGNOSIS — Z00.00 ANNUAL PHYSICAL EXAM: Primary | ICD-10-CM

## 2025-02-25 DIAGNOSIS — Z13.220 LIPID SCREENING: ICD-10-CM

## 2025-02-25 DIAGNOSIS — M54.9 BACK PAIN, UNSPECIFIED BACK LOCATION, UNSPECIFIED BACK PAIN LATERALITY, UNSPECIFIED CHRONICITY: ICD-10-CM

## 2025-02-25 DIAGNOSIS — Z23 INFLUENZA VACCINE NEEDED: ICD-10-CM

## 2025-02-25 PROCEDURE — 99395 PREV VISIT EST AGE 18-39: CPT | Mod: 25,S$GLB,,

## 2025-02-25 PROCEDURE — 3008F BODY MASS INDEX DOCD: CPT | Mod: CPTII,S$GLB,,

## 2025-02-25 PROCEDURE — 3074F SYST BP LT 130 MM HG: CPT | Mod: CPTII,S$GLB,,

## 2025-02-25 PROCEDURE — 3078F DIAST BP <80 MM HG: CPT | Mod: CPTII,S$GLB,,

## 2025-02-25 PROCEDURE — 1160F RVW MEDS BY RX/DR IN RCRD: CPT | Mod: CPTII,S$GLB,,

## 2025-02-25 PROCEDURE — 90480 ADMN SARSCOV2 VAC 1/ONLY CMP: CPT | Mod: S$GLB,,,

## 2025-02-25 PROCEDURE — 90656 IIV3 VACC NO PRSV 0.5 ML IM: CPT | Mod: S$GLB,,,

## 2025-02-25 PROCEDURE — 1159F MED LIST DOCD IN RCRD: CPT | Mod: CPTII,S$GLB,,

## 2025-02-25 PROCEDURE — 99999 PR PBB SHADOW E&M-EST. PATIENT-LVL IV: CPT | Mod: PBBFAC,,,

## 2025-02-25 PROCEDURE — G0008 ADMIN INFLUENZA VIRUS VAC: HCPCS | Mod: S$GLB,,,

## 2025-02-25 PROCEDURE — 91320 SARSCV2 VAC 30MCG TRS-SUC IM: CPT | Mod: S$GLB,,,

## 2025-02-25 RX ORDER — METHOCARBAMOL 500 MG/1
500 TABLET, FILM COATED ORAL NIGHTLY PRN
Qty: 30 TABLET | Refills: 0 | Status: SHIPPED | OUTPATIENT
Start: 2025-02-25 | End: 2025-03-07

## 2025-02-25 RX ORDER — IBUPROFEN 600 MG/1
600 TABLET ORAL 3 TIMES DAILY
Qty: 30 TABLET | Refills: 0 | Status: SHIPPED | OUTPATIENT
Start: 2025-02-25

## 2025-02-25 RX ORDER — ONDANSETRON 4 MG/1
4 TABLET, FILM COATED ORAL EVERY 8 HOURS PRN
Qty: 60 TABLET | Refills: 0 | Status: SHIPPED | OUTPATIENT
Start: 2025-02-25

## 2025-02-25 NOTE — PROGRESS NOTES
HPI     Chief Complaint:  Chief Complaint   Patient presents with    Annual Exam       Azam Gregory is a 39 y.o. male with multiple medical diagnoses as listed in the medical history and problem list that presents for  annual exam    HPI    History of Present Illness    CHIEF COMPLAINT:  Azam presents today for follow-up    BACK PAIN:  He reports right-sided lower back pain that worsens with prolonged standing, causing significant mobility limitations. Pain is particularly severe upon waking when sleeping in certain positions. He experiences limited range of motion and notes a non-painful popping sensation in the lower back/hip area with certain movements. He denies numbness or tingling. Previous chiropractic treatment was helpful but discontinued due to financial constraints.    ANXIETY:  He reports longstanding travel anxiety since childhood, characterized by urgent bathroom needs immediately before leaving home, even for short trips. During long road trips, he experiences increased bathroom frequency. Previous trials of nighttime anxiety medication provided brief relief but caused constipation. A sublingual medication for stomach discomfort was also ineffective. Symptoms are not present at home during regular activities.    LIFESTYLE:  He exercises twice weekly and sleeps 6-8 hours per night. He maintains adequate hydration with four 16 oz bottles of water daily.    PREVENTIVE CARE:  He receives regular eye exams at [facility] and maintains routine dental cleanings. He agrees to COVID and flu vaccinations, as well as comprehensive labs including electrolytes, kidney function, liver function, blood sugar, cholesterol panels, and STD screening.      ROS:  General: -fever, -chills, -fatigue, -weight gain, -weight loss  Eyes: -vision changes, -redness, -discharge  ENT: -ear pain, -nasal congestion, -sore throat  Cardiovascular: -chest pain, -palpitations, -lower extremity edema  Respiratory: -cough, -shortness of  breath  Gastrointestinal: -abdominal pain, -nausea, -vomiting, -diarrhea, -constipation, -blood in stool  Genitourinary: -dysuria, -hematuria, -frequency  Musculoskeletal: -joint pain, -muscle pain, +back pain  Skin: -rash, -lesion  Neurological: -headache, -dizziness, -numbness, -tingling  Psychiatric: +anxiety, -depression, -sleep difficulty             Assessment & Plan     Assessment & Plan    Evaluated lower back pain, likely due to muscle tension and possible spasms  Considered muscle relaxers and anti-inflammatory medication for back pain management  Assessed travel-related anxiety symptoms, potentially benefiting from either medication or cognitive behavioral therapy  Reviewed previous treatments for anxiety, including amitriptyline and sublingual medication      FOLLOW UP:  - Follow up on Friday after 10 AM for lab appointment.  - Contact the office if medications are not effective or if symptoms worsen.  - Follow up as needed with Dr. J Carlos Breaux or Dr. Cullen.         Problem List Items Addressed This Visit       Screen for STD (sexually transmitted disease)    Overview   Negative past screens          Relevant Orders    C. trachomatis/N. gonorrhoeae by AMP DNA [facility]; Urine    Treponema Pallidium Antibodies IgG, IgM    Trichomonas vaginalis, RNA, Qual, Urine    HSV 1 & 2, IgG    HIV 1/2 Ag/Ab (4th Gen)    Hepatitis C Antibody    Influenza vaccine needed    Relevant Medications    influenza (Flulaval, Fluzone, Fluarix) 45 mcg/0.5 mL IM vaccine (> or = 6 mo) 0.5 mL (Completed)   Influenza VACCINATION:  -  influenza vaccine ordered.      Need for COVID-19 vaccine    Relevant Medications    COVID-19 (Pfizer) 30 mcg/0.3 mL IM vaccine (>/= 11 yo) 0.3 mL (Completed)  COVID VACCINATION:  - COVID vaccine ordered.    Annual physical exam - Primary    Relevant Orders    CBC Auto Differential    Comprehensive Metabolic Panel    LABS:  - Comprehensive lab work ordered including electrolytes, kidney function, liver  function, blood sugar, cholesterol, and STD testing, to be done on Friday after 10 AM.    Screening for diabetes mellitus    Relevant Orders    Hemoglobin A1C    LABS:  - Comprehensive lab work ordered including electrolytes, kidney function, liver function, blood sugar, cholesterol, and STD testing, to be done on Friday after 10 AM.    Lipid screening    Relevant Orders    Lipid Panel    LABS:  - Comprehensive lab work ordered including electrolytes, kidney function, liver function, blood sugar, cholesterol, and STD testing, to be done on Friday after 10 AM.    Back pain    Relevant Medications    methocarbamoL (ROBAXIN) 500 MG Tab    ibuprofen (ADVIL,MOTRIN) 600 MG tablet    Other Relevant Orders    Ambulatory Referral/Consult to Physical Therapy/Occupational Therapy  LOWER BACK PAIN:  - Assessed the patient's ongoing back problems, particularly on the right side of the lower back.  - Pain is consistent and affects mobility after prolonged standing or certain sleeping positions.  - Conducted a physical exam revealing tension in the right side of the lower back.  - No pain was elicited with pressure on the spine.  - Azam can bend over and touch toes, but has limited range of motion in side-to-side movements.  - Noted the patient's report of a popping sensation in the lower hip/back area when moving the leg, only on the right side.  - Explained that the popping sensation is likely due to muscle tension rather than bones rubbing together.  - Diagnosed the condition as muscle tension and possible muscle spasm in the lower back area.  - Prescribed Robaxin (methocarbamol) as a muscle relaxer, to be taken in the evening or at night when consecutive hours of sleep are possible.  - Initiated an anti-inflammatory medication for back pain management.  - Discussed the purpose of anti-inflammatory medications in reducing inflammation and muscle relaxers in reducing muscle tension.  - Referred the patient to physical therapy to  improve flexibility, strengthen muscles, and prevent recurrence.  - Instructed the patient to report if symptoms persist or worsen despite the prescribed treatment.  - Evaluated the patient's report of tension and possible muscle spasm in the right side of the lower back.  - Conducted a physical exam revealing tension in the right side of the lower back, consistent with muscle spasm.  - Diagnosed the condition as muscle tension and possible muscle spasm in the lower back area.  - Prescribed Robaxin (methocarbamol) as a muscle relaxer to reduce muscle tension and spasm, to be taken in the evening or at night when consecutive hours of sleep are possible.  - Discussed the purpose of muscle relaxers in reducing muscle tension.  - Referred the patient to physical therapy to improve muscle strength and flexibility.  - Azam to strengthen back muscles to prevent recurrence of pain.  - Recommend practicing stretching exercises to improve flexibility and range of motion.      Therapy    Relevant Medications    ondansetron (ZOFRAN) 4 MG tablet    Other Relevant Orders    Ambulatory referral/consult to Behavioral Health  TRAVEL ANXIETY:  - Evaluated the patient's report of severe travel anxiety, leading to an immediate urge to use the bathroom before leaving home.  - Noted the patient's description of anxiety symptoms as stomach agitation before and during travel, with increased need to use the restroom.  - Assessed the condition as anxiety-induced, possibly a trained response to travel situations.  - Prescribed Zofran (ondansetron) for stomach discomfort related to travel anxiety, to be taken before leaving for work or anticipated anxiety-inducing situations.  - Referred the patient to cognitive behavioral therapy to address travel-related anxiety.  - Suggested considering other anxiolytic medications if current treatment proves ineffective.    Upset stomach    Relevant Medications    ondansetron (ZOFRAN) 4 MG tablet    Other  Relevant Orders    Ambulatory referral/consult to Behavioral Health    ANXIETY-INDUCED GASTROINTESTINAL SYMPTOMS:  - Evaluated the patient's report of frequent urge to use the bathroom,   particularly before traveling or during long trips.  - Noted the patient's description of symptoms as stomach agitation and increased need to use the restroom, particularly associated with travel.  - Assessed the condition as potentially anxiety-induced GI symptoms.  - Prescribed Zofran (ondansetron) to help with stomach sensations and GI symptoms.  - Recommend cognitive behavioral therapy to address underlying anxiety that may be contributing to GI symptoms.  - Instructed the patient to monitor symptoms and report any changes or persistence of diarrhea.    Anxiousness    Relevant Orders    Ambulatory referral/consult to Behavioral Health  TRAVEL ANXIETY:  - Evaluated the patient's report of severe travel anxiety, leading to an immediate urge to use the bathroom before leaving home.  - Noted the patient's description of anxiety symptoms as stomach agitation before and during travel, with increased need to use the restroom.  - Assessed the condition as anxiety-induced, possibly a trained response to travel situations.  - Prescribed Zofran (ondansetron) for stomach discomfort related to travel anxiety, to be taken before leaving for work or anticipated anxiety-inducing situations.  - Referred the patient to cognitive behavioral therapy to address travel-related anxiety.  - Suggested considering other anxiolytic medications if current treatment proves ineffective.         --------------------------------------------      Health Maintenance:  Health Maintenance         Date Due Completion Date    Hemoglobin A1c (Diabetic Prevention Screening) 06/08/2026 6/8/2023    Lipid Panel 06/08/2028 6/8/2023    TETANUS VACCINE 07/16/2034 7/16/2024    RSV Vaccine (Age 60+ and Pregnant patients) (1 - 1-dose 75+ series) 08/09/2061 ---       "      Health maintenance reviewed, Advised patient on the importance of completing overdue health maintenance items, and Flu vaccine ordered   and COVID vaccine    Follow Up:  Follow up if symptoms worsen or fail to improve.    Exam     Review of Systems:  (as noted above)  Review of Systems    Physical Exam:   Physical Exam  Vitals:    02/25/25 1502   BP: 118/70   BP Location: Right arm   Patient Position: Sitting   Pulse: 74   Resp: 16   Temp: 98.4 °F (36.9 °C)   TempSrc: Oral   SpO2: 95%   Weight: 75.3 kg (166 lb 0.1 oz)   Height: 5' 2" (1.575 m)      Body mass index is 30.36 kg/m².    Physical Exam    General: No acute distress. Well-developed. Well-nourished.  Eyes: EOMI. Sclerae anicteric.  HENT: Normocephalic. Atraumatic. Nares patent.  Cardiovascular: Regular rate. Regular rhythm. No murmurs. No rubs. No gallops. Normal S1, S2.  Respiratory: Normal respiratory effort. Clear to auscultation bilaterally. No rales. No rhonchi. No wheezing.  Abdomen: Soft. Non-tender.   Musculoskeletal: No  obvious deformity. Limited range of motion. Popping in left hip.  Extremities: No lower extremity edema.  Neurological: Alert & oriented x3. No slurred speech. Normal gait.  Psychiatric: Normal mood. Normal affect. Good insight. Good judgment.  Skin: Warm. Dry. No rash.  Back: No pain with pressing on spine.           History     Past Medical History:  Past Medical History:   Diagnosis Date    Anxiousness 2/25/2025    IBS (irritable bowel syndrome)     Ulcer        Past Surgical History:  Past Surgical History:   Procedure Laterality Date    braces      HERNIA REPAIR         Social History:  Social History[1]    Family History:  Family History   Problem Relation Name Age of Onset    Hypertension Mother      Diabetes Mother      Anxiety disorder Mother      Prostate cancer Maternal Grandmother      Colon cancer Neg Hx      Esophageal cancer Neg Hx         Allergies and Medications: (updated and reviewed)  Review of patient's " allergies indicates:  No Known Allergies  Current Medications[2]    No care team member to display         - The patient is given an After Visit Summary that lists all medications with directions, allergies, education, orders placed during this encounter and follow-up instructions.      - I have reviewed the patient's medical information including past medical, family, and social history sections including the medications and allergies.      - We discussed the patient's current medications.     This note was created by combination of typed  and MModal dictation.  Transcription errors may be present.  If there are any questions, please contact me.     This note was generated with the assistance of ambient listening technology. Verbal consent was obtained by the patient and accompanying visitor(s) for the recording of patient appointment to facilitate this note. I attest to having reviewed and edited the generated note for accuracy, though some syntax or spelling errors may persist. Please contact the author of this note for any clarification.          Li Pizano PA-C                      [1]   Social History  Socioeconomic History    Marital status: Single   Tobacco Use    Smoking status: Former     Current packs/day: 0.10     Average packs/day: 0.1 packs/day for 2.0 years (0.2 ttl pk-yrs)     Types: Cigarettes    Smokeless tobacco: Never   Substance and Sexual Activity    Alcohol use: Yes     Alcohol/week: 3.0 standard drinks of alcohol     Types: 3 Standard drinks or equivalent per week     Comment: 3 drinks weekly    Drug use: No    Sexual activity: Yes     Partners: Female     Birth control/protection: None   [2]   Current Outpatient Medications   Medication Sig Dispense Refill    amitriptyline (ELAVIL) 10 MG tablet TAKE 1 TABLET(10 MG) BY MOUTH EVERY NIGHT AS NEEDED FOR INSOMNIA 90 tablet 0    hyoscyamine (LEVSIN/SL) 0.125 mg Subl Place 1 tablet (0.125 mg total) under the tongue every  6 (six) hours as needed (gi upset). 60 tablet 2    ibuprofen (ADVIL,MOTRIN) 600 MG tablet Take 1 tablet (600 mg total) by mouth 3 (three) times daily. 30 tablet 0    methocarbamoL (ROBAXIN) 500 MG Tab Take 1 tablet (500 mg total) by mouth nightly as needed (as needed for pain/spasm). 30 tablet 0    ondansetron (ZOFRAN) 4 MG tablet Take 1 tablet (4 mg total) by mouth every 8 (eight) hours as needed for Nausea. 60 tablet 0     No current facility-administered medications for this visit.

## 2025-02-25 NOTE — PROGRESS NOTES
Health Maintenance Due   Topic     Influenza Vaccine (1)     COVID-19 Vaccine (4 - 2024-25 season)

## 2025-02-28 ENCOUNTER — LAB VISIT (OUTPATIENT)
Dept: LAB | Facility: HOSPITAL | Age: 40
End: 2025-02-28
Payer: COMMERCIAL

## 2025-02-28 DIAGNOSIS — Z11.3 SCREEN FOR STD (SEXUALLY TRANSMITTED DISEASE): ICD-10-CM

## 2025-02-28 DIAGNOSIS — Z13.1 SCREENING FOR DIABETES MELLITUS: ICD-10-CM

## 2025-02-28 DIAGNOSIS — Z13.220 LIPID SCREENING: ICD-10-CM

## 2025-02-28 DIAGNOSIS — Z00.00 ANNUAL PHYSICAL EXAM: ICD-10-CM

## 2025-02-28 LAB
ALBUMIN SERPL BCP-MCNC: 4 G/DL (ref 3.5–5.2)
ALP SERPL-CCNC: 61 U/L (ref 40–150)
ALT SERPL W/O P-5'-P-CCNC: 25 U/L (ref 10–44)
ANION GAP SERPL CALC-SCNC: 8 MMOL/L (ref 8–16)
AST SERPL-CCNC: 31 U/L (ref 10–40)
BASOPHILS # BLD AUTO: 0.02 K/UL (ref 0–0.2)
BASOPHILS NFR BLD: 0.3 % (ref 0–1.9)
BILIRUB SERPL-MCNC: 0.4 MG/DL (ref 0.1–1)
BUN SERPL-MCNC: 13 MG/DL (ref 6–20)
CALCIUM SERPL-MCNC: 8.8 MG/DL (ref 8.7–10.5)
CHLORIDE SERPL-SCNC: 107 MMOL/L (ref 95–110)
CHOLEST SERPL-MCNC: 140 MG/DL (ref 120–199)
CHOLEST/HDLC SERPL: 3.5 {RATIO} (ref 2–5)
CO2 SERPL-SCNC: 25 MMOL/L (ref 23–29)
CREAT SERPL-MCNC: 0.9 MG/DL (ref 0.5–1.4)
DIFFERENTIAL METHOD BLD: ABNORMAL
EOSINOPHIL # BLD AUTO: 0.1 K/UL (ref 0–0.5)
EOSINOPHIL NFR BLD: 0.9 % (ref 0–8)
ERYTHROCYTE [DISTWIDTH] IN BLOOD BY AUTOMATED COUNT: 14.7 % (ref 11.5–14.5)
EST. GFR  (NO RACE VARIABLE): >60 ML/MIN/1.73 M^2
ESTIMATED AVG GLUCOSE: 117 MG/DL (ref 68–131)
GLUCOSE SERPL-MCNC: 87 MG/DL (ref 70–110)
HBA1C MFR BLD: 5.7 % (ref 4–5.6)
HCT VFR BLD AUTO: 38.4 % (ref 40–54)
HCV AB SERPL QL IA: NORMAL
HDLC SERPL-MCNC: 40 MG/DL (ref 40–75)
HDLC SERPL: 28.6 % (ref 20–50)
HGB BLD-MCNC: 12.8 G/DL (ref 14–18)
HIV 1+2 AB+HIV1 P24 AG SERPL QL IA: NORMAL
IMM GRANULOCYTES # BLD AUTO: 0.04 K/UL (ref 0–0.04)
IMM GRANULOCYTES NFR BLD AUTO: 0.7 % (ref 0–0.5)
LDLC SERPL CALC-MCNC: 80 MG/DL (ref 63–159)
LYMPHOCYTES # BLD AUTO: 1.9 K/UL (ref 1–4.8)
LYMPHOCYTES NFR BLD: 33 % (ref 18–48)
MCH RBC QN AUTO: 29.4 PG (ref 27–31)
MCHC RBC AUTO-ENTMCNC: 33.3 G/DL (ref 32–36)
MCV RBC AUTO: 88 FL (ref 82–98)
MONOCYTES # BLD AUTO: 0.5 K/UL (ref 0.3–1)
MONOCYTES NFR BLD: 9.4 % (ref 4–15)
NEUTROPHILS # BLD AUTO: 3.2 K/UL (ref 1.8–7.7)
NEUTROPHILS NFR BLD: 55.7 % (ref 38–73)
NONHDLC SERPL-MCNC: 100 MG/DL
NRBC BLD-RTO: 0 /100 WBC
PLATELET # BLD AUTO: 237 K/UL (ref 150–450)
PMV BLD AUTO: 11.3 FL (ref 9.2–12.9)
POTASSIUM SERPL-SCNC: 4.4 MMOL/L (ref 3.5–5.1)
PROT SERPL-MCNC: 7.4 G/DL (ref 6–8.4)
RBC # BLD AUTO: 4.36 M/UL (ref 4.6–6.2)
SODIUM SERPL-SCNC: 140 MMOL/L (ref 136–145)
TREPONEMA PALLIDUM IGG+IGM AB [PRESENCE] IN SERUM OR PLASMA BY IMMUNOASSAY: NONREACTIVE
TRIGL SERPL-MCNC: 100 MG/DL (ref 30–150)
WBC # BLD AUTO: 5.73 K/UL (ref 3.9–12.7)

## 2025-02-28 PROCEDURE — 83036 HEMOGLOBIN GLYCOSYLATED A1C: CPT

## 2025-02-28 PROCEDURE — 36415 COLL VENOUS BLD VENIPUNCTURE: CPT | Mod: PO

## 2025-02-28 PROCEDURE — 87389 HIV-1 AG W/HIV-1&-2 AB AG IA: CPT

## 2025-02-28 PROCEDURE — 80061 LIPID PANEL: CPT

## 2025-02-28 PROCEDURE — 86696 HERPES SIMPLEX TYPE 2 TEST: CPT

## 2025-02-28 PROCEDURE — 86803 HEPATITIS C AB TEST: CPT

## 2025-02-28 PROCEDURE — 86593 SYPHILIS TEST NON-TREP QUANT: CPT

## 2025-02-28 PROCEDURE — 80053 COMPREHEN METABOLIC PANEL: CPT

## 2025-02-28 PROCEDURE — 85025 COMPLETE CBC W/AUTO DIFF WBC: CPT

## 2025-03-03 ENCOUNTER — RESULTS FOLLOW-UP (OUTPATIENT)
Dept: FAMILY MEDICINE | Facility: CLINIC | Age: 40
End: 2025-03-03

## 2025-03-03 LAB
HSV1 IGG SERPL QL IA: NEGATIVE
HSV2 IGG SERPL QL IA: POSITIVE

## 2025-03-10 ENCOUNTER — CLINICAL SUPPORT (OUTPATIENT)
Dept: REHABILITATION | Facility: HOSPITAL | Age: 40
End: 2025-03-10
Payer: COMMERCIAL

## 2025-03-10 DIAGNOSIS — M54.9 BACK PAIN, UNSPECIFIED BACK LOCATION, UNSPECIFIED BACK PAIN LATERALITY, UNSPECIFIED CHRONICITY: ICD-10-CM

## 2025-03-10 DIAGNOSIS — R29.3 ABNORMAL POSTURE: Primary | ICD-10-CM

## 2025-03-10 PROCEDURE — 97161 PT EVAL LOW COMPLEX 20 MIN: CPT | Performed by: PHYSICAL THERAPIST

## 2025-03-10 PROCEDURE — 97112 NEUROMUSCULAR REEDUCATION: CPT | Performed by: PHYSICAL THERAPIST
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
## 2025-03-11 PROBLEM — R29.3 ABNORMAL POSTURE: Status: ACTIVE | Noted: 2025-03-11

## 2025-03-18 ENCOUNTER — CLINICAL SUPPORT (OUTPATIENT)
Dept: REHABILITATION | Facility: HOSPITAL | Age: 40
End: 2025-03-18
Payer: COMMERCIAL

## 2025-03-18 DIAGNOSIS — R29.3 ABNORMAL POSTURE: Primary | ICD-10-CM

## 2025-03-18 PROCEDURE — 97112 NEUROMUSCULAR REEDUCATION: CPT | Mod: CQ

## 2025-03-18 PROCEDURE — 97530 THERAPEUTIC ACTIVITIES: CPT | Mod: CQ

## 2025-03-18 NOTE — PROGRESS NOTES
"[facility]  Physical Therapy I    Name: Azam Gregory  Clinic Number: [id number]    Therapy Diagnosis:   Encounter Diagnosis   Name Primary?    Abnormal posture Yes     Physician: Rona Pizano*    Physician Orders: PT Eval and Treat  Medical Diagnosis from Referral: M54.9 (ICD-10-CM) - Back pain, unspecified back location, unspecified back pain laterality, unspecified chronicity   Evaluation Date: 3/18/2025  Authorization Period Expiration: 2/25/2026  Plan of Care Expiration: 2/25/2026  Visit # / Visits authorized: 1/1    FOTO: 72  FOTO 1st Follow-up: NA  FOTO 2nd Follow-up: NA    Time In: 1705  Time Out: 1800  Total Billable Time: 55 minutes    Precautions: Standard    Subjective   min pain at present time, increased with bending forward. non compliant with HEP     Imaging: see chart    Prior Therapy: None  Social History: Lives with family  Occupation: see above  Prior Level of Function: No issues  Current Level of Function: Issues with prolonged bending or carrying activities    Pain:  Current 2/10,     Pts Goals: Improve tolerance to movements requiring bending and lifting    Medical History:   Past Medical History:   Diagnosis Date    Anxiousness 2/25/2025    IBS (irritable bowel syndrome)     Ulcer      Objective     Observation: Increased L-sp lordosis in standing; hinge point L2/L3 with extension-based movements    Functional Tests:   Plank: 49"    Side Plank:  R: 21"  L: 21"    Dobson: 8" prior to pain  Double leg squat: No issues    Lumbar ROM:    ROM Pain   Flexion 75% +   Extension 25% +   L Side Bending 75% +   R Side Bending 100% -       Lower Extremity Strength  Right LE  Left LE    Quadriceps: 4+/5 Quadriceps: 4+/5   Hamstrings: 4+/5 Hamstrings: 4+/5   Iliospoas: 4+/5 Iliospoas: 4+/5   Hip extension:  4-/5 Hip extension: 4-/5   PGM: 4-/5 PGM: 4-/5     Sensation: Unremarkable    Reflexes:  -Patellar (L3-L4): normal  -Achilles (S1): normal      Intake Outcome " "Measure for FOTO Back Survey    Therapist reviewed FOTO scores for Azam Butts on 3/18/2025.   FOTO documents entered into [employer] - see Media section.    Intake Score: See Media Section     Treatment     Azam received the treatments listed below:      Therapeutic exercises to develop strength, endurance, ROM, flexibility, posture, and core stabilization for 0 minutes including:    Manual therapy techniques: Joint mobilizations, Manual traction, Myofacial release, and Soft tissue Mobilization were applied to the: lower-back for 0 minutes, including:    Neuromuscular re-education activities to improve: Balance, Coordination, Kinesthetic, Sense, Proprioception, and Posture for 45 minutes. The following activities were included:    Moist heat 10' PPT    SKTC 3x10/3"   DKTC SB 3x10/3"  Child's Pose with Thread the Needle  15x ea   B open books with bolster 30x ea  Supine Piriformis Stretch with Leg Straight  10x   Supine Dead Bug with Leg Extension  10x        Therapeutic activities to improve functional performance for 10 minutes, including:  Stationary bike for 10'       Home Exercises and Patient Education Provided     Education provided:   - Lower-back stability; core strengthening  - Prognosis, activity modification, goals for therapy, role of therapy for care, exercises/HEP    Written Home Exercises Provided:  Exercises were reviewed and Azam was able to demonstrate them prior to the end of the session.   Pt received a written copy of exercises to perform at home. Azam demonstrated good understanding of the education provided.     See EMR under patient instructions for exercises given.     Assessment   Pt tolerating tx well. Continue with core strengthening for decreased L/P with functional activity. VC/TC for correcting form/technique. Progress as tolerated.       Goals   Short Term Goals: 6 weeks  Pt will report decreased lower-back pain  < / =  5/10  to increase tolerance for ADL performance and recreational " routine.  Pt will improve lower-back ROM to WFL in order to be able to perform ADLs without difficulty.  Pt will improve lower-back strength by 1/3 MMT grade to increase tolerance for ADL and work activities  Pt will demonstrate tolerance to HEP to improve independence with ADL's    Long Term Goals: 24 weeks  Pt will report decreased lower-back pain  < / =  3/10  to increase tolerance for ADL performance and recreational routine  Pt will improve lower-back ROM to WNL in order to be able to perform ADLs without difficulty  Pt will improve lower-back strength by 1/3 MMT grade to increase tolerance for ADL and work activities  Pt will report at CJ level (20-40% impaired) on FOTO Back to demonstrate increase in LE function with every day tasks.     Plan   Continue with POC     Iban Ortega, PTA, BREANNA

## 2025-03-25 ENCOUNTER — CLINICAL SUPPORT (OUTPATIENT)
Dept: REHABILITATION | Facility: HOSPITAL | Age: 40
End: 2025-03-25
Payer: COMMERCIAL

## 2025-03-25 DIAGNOSIS — R29.3 ABNORMAL POSTURE: Primary | ICD-10-CM

## 2025-03-25 PROCEDURE — 97530 THERAPEUTIC ACTIVITIES: CPT | Mod: CQ

## 2025-03-25 PROCEDURE — 97112 NEUROMUSCULAR REEDUCATION: CPT | Mod: CQ

## 2025-03-25 NOTE — PROGRESS NOTES
"[facility]  Physical Therapy I    Name: Azam Gregory  Clinic Number: [id number]    Therapy Diagnosis:   Encounter Diagnosis   Name Primary?    Abnormal posture Yes     Physician: Rona Pizano*    Physician Orders: PT Eval and Treat  Medical Diagnosis from Referral: M54.9 (ICD-10-CM) - Back pain, unspecified back location, unspecified back pain laterality, unspecified chronicity   Evaluation Date: 3/25/2025  Authorization Period Expiration: 2/25/2026  Plan of Care Expiration: 2/25/2026  Visit # / Visits authorized: 1/1    FOTO: 72  FOTO 1st Follow-up: NA  FOTO 2nd Follow-up: NA    Time In: 1705  Time Out: 1800  Total Billable Time: 55 minutes    Precautions: Standard    Subjective   min pain reported today "about the same spot"     Imaging: see chart    Prior Therapy: None  Social History: Lives with family  Occupation: see above  Prior Level of Function: No issues  Current Level of Function: Issues with prolonged bending or carrying activities    Pain:  Current 2/10,     Pts Goals: Improve tolerance to movements requiring bending and lifting    Medical History:   Past Medical History:   Diagnosis Date    Anxiousness 2/25/2025    IBS (irritable bowel syndrome)     Ulcer      Objective     Observation: Increased L-sp lordosis in standing; hinge point L2/L3 with extension-based movements    Functional Tests:   Plank: 49"    Side Plank:  R: 21"  L: 21"    Dobson: 8" prior to pain  Double leg squat: No issues    Lumbar ROM:    ROM Pain   Flexion 75% +   Extension 25% +   L Side Bending 75% +   R Side Bending 100% -       Lower Extremity Strength  Right LE  Left LE    Quadriceps: 4+/5 Quadriceps: 4+/5   Hamstrings: 4+/5 Hamstrings: 4+/5   Iliospoas: 4+/5 Iliospoas: 4+/5   Hip extension:  4-/5 Hip extension: 4-/5   PGM: 4-/5 PGM: 4-/5     Sensation: Unremarkable    Reflexes:  -Patellar (L3-L4): normal  -Achilles (S1): normal      Intake Outcome Measure for FOTO Back Survey    Therapist " "reviewed FOTO scores for Azam Butts on 3/25/2025.   FOTO documents entered into [employer] - see Media section.    Intake Score: See Media Section     Treatment     Azam received the treatments listed below:      Therapeutic exercises to develop strength, endurance, ROM, flexibility, posture, and core stabilization for 0 minutes including:    Manual therapy techniques: Joint mobilizations, Manual traction, Myofacial release, and Soft tissue Mobilization were applied to the: lower-back for 0 minutes, including:    Neuromuscular re-education activities to improve: Balance, Coordination, Kinesthetic, Sense, Proprioception, and Posture for 45 minutes. The following activities were included:    Moist heat 10' PPT    SKTC 4x10/3"   DKTC SB 4x10/3"  LTR 30x  Child's Pose with Thread the Needle  15x ea   B open books with bolster 30x ea  Supine Piriformis Stretch with Leg Straight  10x   Supine Dead Bug with Leg Extension 3x10        Therapeutic activities to improve functional performance for 10 minutes, including:  Stationary bike for 10'       Home Exercises and Patient Education Provided     Education provided:   - Lower-back stability; core strengthening  - Prognosis, activity modification, goals for therapy, role of therapy for care, exercises/HEP    Written Home Exercises Provided:  Exercises were reviewed and Azam was able to demonstrate them prior to the end of the session.   Pt received a written copy of exercises to perform at home. Azam demonstrated good understanding of the education provided.     See EMR under patient instructions for exercises given.     Assessment   Pt tolerating tx well. Continue with core strengthening for decreased L/P with functional activity. VC/TC for correcting form/technique. Progress as tolerated.       Goals   Short Term Goals: 6 weeks  Pt will report decreased lower-back pain  < / =  5/10  to increase tolerance for ADL performance and recreational routine.  Pt will improve lower-back ROM to " WFL in order to be able to perform ADLs without difficulty.  Pt will improve lower-back strength by 1/3 MMT grade to increase tolerance for ADL and work activities  Pt will demonstrate tolerance to HEP to improve independence with ADL's    Long Term Goals: 24 weeks  Pt will report decreased lower-back pain  < / =  3/10  to increase tolerance for ADL performance and recreational routine  Pt will improve lower-back ROM to WNL in order to be able to perform ADLs without difficulty  Pt will improve lower-back strength by 1/3 MMT grade to increase tolerance for ADL and work activities  Pt will report at CJ level (20-40% impaired) on FOTO Back to demonstrate increase in LE function with every day tasks.     Plan   Continue with POC     Iban Ortega, PTA, STS

## 2025-04-01 ENCOUNTER — CLINICAL SUPPORT (OUTPATIENT)
Dept: REHABILITATION | Facility: HOSPITAL | Age: 40
End: 2025-04-01
Payer: COMMERCIAL

## 2025-04-01 DIAGNOSIS — Z51.89 THERAPY: ICD-10-CM

## 2025-04-01 DIAGNOSIS — R29.3 ABNORMAL POSTURE: Primary | ICD-10-CM

## 2025-04-01 DIAGNOSIS — K30 UPSET STOMACH: ICD-10-CM

## 2025-04-01 DIAGNOSIS — G89.29 CHRONIC RIGHT-SIDED LOW BACK PAIN WITHOUT SCIATICA: ICD-10-CM

## 2025-04-01 DIAGNOSIS — M54.50 CHRONIC RIGHT-SIDED LOW BACK PAIN WITHOUT SCIATICA: ICD-10-CM

## 2025-04-01 PROCEDURE — 97112 NEUROMUSCULAR REEDUCATION: CPT | Performed by: PHYSICAL THERAPIST

## 2025-04-01 PROCEDURE — 97530 THERAPEUTIC ACTIVITIES: CPT | Performed by: PHYSICAL THERAPIST

## 2025-04-01 RX ORDER — ONDANSETRON 4 MG/1
4 TABLET, FILM COATED ORAL EVERY 8 HOURS PRN
Qty: 60 TABLET | Refills: 0 | Status: SHIPPED | OUTPATIENT
Start: 2025-04-01

## 2025-04-01 NOTE — PROGRESS NOTES
"  Outpatient Rehab    Physical Therapy Visit    Patient Name: Azam Gregory  MRN: [id number]  YOB: 1985  Encounter Date: 4/1/2025    Therapy Diagnosis:   Encounter Diagnosis   Name Primary?    Abnormal posture Yes     Physician: Rona Pizano*    Physician Orders: Eval and Treat  Medical Diagnosis: Back pain, unspecified back location, unspecified back pain laterality, unspecified chronicity    Visit # / Visits Authorized:  3 / 20  Insurance Authorization Period: 3/7/2025 to 12/31/2025  Date of Evaluation: 3/10/2025  Plan of Care Certification: 3/10/2025 to 9/10/2025     PT/PTA:     Number of PTA visits since last PT visit:   Time In: 1508   Time Out: 1603  Total Time: 55   Total Billable Time: 55    FOTO:  Intake Score:  %  Survey Score 1:  %  Survey Score 2:  %         Subjective   Patient reports minimal pain in his R low back.  Pain reported as 2/10.      Objective            Treatment:  Balance/Neuromuscular Re-Education  NMR 1: Side planks on knees 3x30"  NMR 2: Dead bugs with LE ext 3x10  NMR 3: Palloff press 10# 3x15  Therapeutic Activity  TA 1: Upright bike 8 minutes  TA 2: [employer] [place] press 5#, 3x10  TA 3: Lumbar segmental assessment performed    Time Entry(in minutes):  Neuromuscular Re-Education Time Entry: 40  Therapeutic Activity Time Entry: 15    Assessment & Plan   Assessment: Lumbar segmental movement performed with noted hypomobility at L3-L5 and an L3 hinge point with active flexion, extension and R side bending. Introduced abdominal strengthening activity to promote postural stability and to help maintain a neutral spine with functional activity. Activty was tolerated with mod muscualr fatigue but no adverse affects.  Evaluation/Treatment Tolerance: Patient tolerated treatment well    Patient will continue to benefit from skilled outpatient physical therapy to address the deficits listed in the problem list box on initial evaluation, provide pt/family education and to maximize " pt's level of independence in the home and community environment.     Patient's spiritual, cultural, and educational needs considered and patient agreeable to plan of care and goals.           Plan: Progress activity per POC    Goals:     Greg Pickard, PT  Co treat with Paresh Mejía PT, DPT

## 2025-04-02 PROBLEM — G89.29 CHRONIC RIGHT-SIDED LOW BACK PAIN WITHOUT SCIATICA: Status: ACTIVE | Noted: 2025-04-02

## 2025-04-02 PROBLEM — M54.50 CHRONIC RIGHT-SIDED LOW BACK PAIN WITHOUT SCIATICA: Status: ACTIVE | Noted: 2025-04-02

## 2025-04-02 NOTE — PROGRESS NOTES
"Outpatient Rehab    Physical Therapy Visit    Patient Name: Azam Gregory  MRN: [id number]  YOB: 1985  Encounter Date: 4/1/2025    Therapy Diagnosis:   Encounter Diagnoses   Name Primary?    Abnormal posture Yes    Chronic right-sided low back pain without sciatica      Physician: Rona Pizano*    Physician Orders: Eval and Treat  Medical Diagnosis: Back pain, unspecified back location, unspecified back pain laterality, unspecified chronicity    Visit # / Visits Authorized:  3 / 20  Insurance Authorization Period: 3/7/2025 to 12/31/2025  Date of Evaluation: 3/10/2025  Plan of Care Certification: 3/10/2025 to 9/10/2025     PT/PTA:     Number of PTA visits since last PT visit:   Time In: 1500   Time Out: 1605  Total Time: 65   Total Billable Time:        Subjective   Patient reports minimal pain in his R low back.  Pain reported as 2/10.      Objective    Objective Measures updated at progress report unless specified        Treatment: PT Tech Extender utilized under supervision throughout tx session  Balance/Neuromuscular Re-Education  NMR 1: Side planks on knees 3x30"  NMR 2: Dead bugs with LE ext 3x10  NMR 3: Palloff press 10# 3x15  Therapeutic Activity  TA 1: Upright bike 8 minutes  TA 2: [employer] [place] press 5#, 3x10  TA 3: Lumbar segmental assessment performed    Time Entry(in minutes):  Neuromuscular Re-Education Time Entry: 30  Therapeutic Activity Time Entry: 30    Assessment & Plan   Assessment: Lumbar segmental movement performed with noted hypomobility at L3-L5 and an L3 hinge point with active flexion, extension and R side bending. Introduced abdominal strengthening activity to promote postural stability and to help maintain a neutral spine with functional activity. Activty was tolerated with mod muscualr fatigue but no adverse affects.       Patient will continue to benefit from skilled outpatient physical therapy to address the deficits listed in the problem list box on initial " evaluation, provide pt/family education and to maximize pt's level of independence in the home and community environment.     Patient's spiritual, cultural, and educational needs considered and patient agreeable to plan of care and goals.           Plan: Progress activity per POC    Goals:     Paresh Mjeía, PT, DPT  Co treat with Greg Pickard PT, DPT

## 2025-04-08 ENCOUNTER — HOSPITAL ENCOUNTER (EMERGENCY)
Facility: HOSPITAL | Age: 40
Discharge: HOME | End: 2025-04-08
Attending: EMERGENCY MEDICINE
Payer: COMMERCIAL

## 2025-04-08 VITALS
WEIGHT: 165 LBS | BODY MASS INDEX: 30.36 KG/M2 | RESPIRATION RATE: 18 BRPM | OXYGEN SATURATION: 99 % | TEMPERATURE: 99 F | DIASTOLIC BLOOD PRESSURE: 80 MMHG | HEART RATE: 66 BPM | SYSTOLIC BLOOD PRESSURE: 145 MMHG | HEIGHT: 62 IN
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
## 2025-04-08 DIAGNOSIS — M25.539 WRIST PAIN: ICD-10-CM

## 2025-04-08 DIAGNOSIS — R07.89 CHEST WALL PAIN: ICD-10-CM

## 2025-04-08 DIAGNOSIS — S00.03XA CONTUSION OF SCALP, INITIAL ENCOUNTER: ICD-10-CM

## 2025-04-08 PROCEDURE — 99284 EMERGENCY DEPT VISIT MOD MDM: CPT | Mod: 25

## 2025-04-08 PROCEDURE — 29125 APPL SHORT ARM SPLINT STATIC: CPT | Mod: RT

## 2025-04-08 RX ORDER — METHOCARBAMOL 750 MG/1
1500 TABLET, FILM COATED ORAL 3 TIMES DAILY PRN
Qty: 30 TABLET | Refills: 0 | Status: SHIPPED | OUTPATIENT
Start: 2025-04-08 | End: 2025-04-13

## 2025-04-08 NOTE — Clinical Note
"Azam Bradshawe"Bri was seen and treated in our emergency department on 4/8/2025.  He may return to work on 04/11/2025.       If you have any questions or concerns, please don't hesitate to call.      Phil Gordon PA-C"
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
## 2025-04-09 NOTE — DISCHARGE INSTRUCTIONS
Please follow-up with orthopedics for re-evaluation of wrist and hand pains, swelling.  Keep the splint in place to prevent worsening pain, to rest the joint, until you follow up with ortho--you can remove to wash hands and clean.  Ice, compression, elevation to the wrist to help with swelling and discomfort.    Tylenol, ibuprofen as needed for pain.  Robaxin for continued stiffness/soreness. Be aware, this medication is sedating.  Do not mix with alcohol or any other sedating medications.  Do not drive or operate machinery when taking this medication.     Return to this ED if you develop severe headache, if you begin with vomiting, if you feel lightheaded or dizzy, if you are unsteady with walking, if worsening pain and swelling to the hand and wrist, if joint becomes red and warm, if you develop fever, if you develop severe neck pain or stiffness, if you begin with arm weakness, if any other problems occur.

## 2025-04-22 ENCOUNTER — CLINICAL SUPPORT (OUTPATIENT)
Dept: REHABILITATION | Facility: HOSPITAL | Age: 40
End: 2025-04-22
Payer: COMMERCIAL

## 2025-04-22 DIAGNOSIS — M54.50 CHRONIC RIGHT-SIDED LOW BACK PAIN WITHOUT SCIATICA: ICD-10-CM

## 2025-04-22 DIAGNOSIS — G89.29 CHRONIC RIGHT-SIDED LOW BACK PAIN WITHOUT SCIATICA: ICD-10-CM

## 2025-04-22 DIAGNOSIS — R29.3 ABNORMAL POSTURE: Primary | ICD-10-CM

## 2025-04-22 PROCEDURE — 97530 THERAPEUTIC ACTIVITIES: CPT | Performed by: PHYSICAL THERAPIST

## 2025-04-22 PROCEDURE — 97112 NEUROMUSCULAR REEDUCATION: CPT | Performed by: PHYSICAL THERAPIST

## 2025-04-24 ENCOUNTER — PATIENT MESSAGE (OUTPATIENT)
Dept: ORTHOPEDICS | Facility: CLINIC | Age: 40
End: 2025-04-24

## 2025-04-24 ENCOUNTER — OFFICE VISIT (OUTPATIENT)
Dept: ORTHOPEDICS | Facility: CLINIC | Age: 40
End: 2025-04-24
Payer: COMMERCIAL

## 2025-04-24 ENCOUNTER — TELEPHONE (OUTPATIENT)
Dept: ORTHOPEDICS | Facility: CLINIC | Age: 40
End: 2025-04-24

## 2025-04-24 DIAGNOSIS — S62.009A SCAPHOID FRACTURE OF WRIST: ICD-10-CM

## 2025-04-24 DIAGNOSIS — M25.539 WRIST PAIN: ICD-10-CM

## 2025-04-24 DIAGNOSIS — M25.532 LEFT WRIST PAIN: ICD-10-CM

## 2025-04-24 PROCEDURE — 99204 OFFICE O/P NEW MOD 45 MIN: CPT | Mod: S$GLB,,, | Performed by: ORTHOPAEDIC SURGERY

## 2025-04-24 PROCEDURE — 99999 PR PBB SHADOW E&M-EST. PATIENT-LVL II: CPT | Mod: PBBFAC,,, | Performed by: ORTHOPAEDIC SURGERY

## 2025-04-24 PROCEDURE — 3044F HG A1C LEVEL LT 7.0%: CPT | Mod: CPTII,S$GLB,, | Performed by: ORTHOPAEDIC SURGERY
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
## 2025-04-29 ENCOUNTER — CLINICAL SUPPORT (OUTPATIENT)
Dept: REHABILITATION | Facility: HOSPITAL | Age: 40
End: 2025-04-29
Payer: COMMERCIAL

## 2025-04-29 DIAGNOSIS — M54.50 CHRONIC RIGHT-SIDED LOW BACK PAIN WITHOUT SCIATICA: ICD-10-CM

## 2025-04-29 DIAGNOSIS — G89.29 CHRONIC RIGHT-SIDED LOW BACK PAIN WITHOUT SCIATICA: ICD-10-CM

## 2025-04-29 DIAGNOSIS — R29.3 ABNORMAL POSTURE: Primary | ICD-10-CM

## 2025-04-29 PROCEDURE — 97112 NEUROMUSCULAR REEDUCATION: CPT | Performed by: PHYSICAL THERAPIST

## 2025-04-29 PROCEDURE — 97530 THERAPEUTIC ACTIVITIES: CPT | Performed by: PHYSICAL THERAPIST

## 2025-04-30 NOTE — PROGRESS NOTES
"Outpatient Rehab    Physical Therapy Visit    Patient Name: Azam Gregory  MRN: [id number]  YOB: 1985  Encounter Date: 4/29/2025    Therapy Diagnosis:   Encounter Diagnoses   Name Primary?    Abnormal posture Yes    Chronic right-sided low back pain without sciatica      Physician: Rona Pizano*    Physician Orders: Eval and Treat  Medical Diagnosis: Back pain, unspecified back location, unspecified back pain laterality, unspecified chronicity    Visit # / Visits Authorized:  5 / 20  Insurance Authorization Period: 3/7/2025 to 12/31/2025  Date of Evaluation: 3/10/2025  Plan of Care Certification: 3/10/2025 to 9/10/2025     PT/PTA:     Number of PTA visits since last PT visit:   Time In: 1635   Time Out: 1740  Total Time: 65   Total Billable Time:        Subjective   Mild soreness after last session, but no pain..  Pain reported as 1/10.      Objective    Objective Measures updated at progress report unless specified        Treatment: PT Tech Extender utilized under supervision throughout tx session  Balance/Neuromuscular Re-Education  NMR 1: Side planks on knees 3x30"  NMR 2: Dead bugs with LE ext 3x10  NMR 3: Palloff press 10# 3x15  NMR 4: Aaorn Curls 2 x 10  Therapeutic Activity  TA 1: Upright bike 8 minutes  TA 2: Standing Paloff CC 4 x 10 ea  TA 3: Tall Kneel Lat Pulldowns 4 x 10    Time Entry(in minutes):  Neuromuscular Re-Education Time Entry: 30  Therapeutic Activity Time Entry: 30    Assessment & Plan   Assessment: Able to work in more lower-back and posterior chain endurance/strengthening activities with good neutral spine control. Progress hip extension interventions.       Patient will continue to benefit from skilled outpatient physical therapy to address the deficits listed in the problem list box on initial evaluation, provide pt/family education and to maximize pt's level of independence in the home and community environment.     Patient's spiritual, cultural, and educational " needs considered and patient agreeable to plan of care and goals.           Plan: Progress activity per POC    Goals:   Short Term Goals: 6 weeks  Pt will report decreased lower-back pain  < / =  5/10  to increase tolerance for ADL performance and recreational routine.  Pt will improve lower-back ROM to WFL in order to be able to perform ADLs without difficulty.  Pt will improve lower-back strength by 1/3 MMT grade to increase tolerance for ADL and work activities  Pt will demonstrate tolerance to HEP to improve independence with ADL's     Long Term Goals: 24 weeks  Pt will report decreased lower-back pain  < / =  3/10  to increase tolerance for ADL performance and recreational routine  Pt will improve lower-back ROM to WNL in order to be able to perform ADLs without difficulty  Pt will improve lower-back strength by 1/3 MMT grade to increase tolerance for ADL and work activities  Pt will report at CJ level (20-40% impaired) on FOTO Back to demonstrate increase in LE function with every day tasks.     Paresh Mejía, PT, DPT

## 2025-05-03 ENCOUNTER — HOSPITAL ENCOUNTER (OUTPATIENT)
Dept: RADIOLOGY | Facility: HOSPITAL | Age: 40
Discharge: HOME | End: 2025-05-03
Attending: ORTHOPAEDIC SURGERY
Payer: COMMERCIAL

## 2025-05-03 DIAGNOSIS — S62.009A SCAPHOID FRACTURE OF WRIST: ICD-10-CM

## 2025-05-03 PROCEDURE — 73221 MRI JOINT UPR EXTREM W/O DYE: CPT | Mod: TC,RT

## 2025-05-03 PROCEDURE — 73221 MRI JOINT UPR EXTREM W/O DYE: CPT | Mod: 26,RT,, | Performed by: INTERNAL MEDICINE

## 2025-05-05 ENCOUNTER — OFFICE VISIT (OUTPATIENT)
Dept: ORTHOPEDICS | Facility: CLINIC | Age: 40
End: 2025-05-05
Payer: COMMERCIAL

## 2025-05-05 DIAGNOSIS — M25.532 LEFT WRIST PAIN: Primary | ICD-10-CM
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
## 2025-05-06 ENCOUNTER — HOSPITAL ENCOUNTER (OUTPATIENT)
Dept: RADIOLOGY | Facility: HOSPITAL | Age: 40
Discharge: HOME | End: 2025-05-06
Attending: ORTHOPAEDIC SURGERY
Payer: COMMERCIAL

## 2025-05-06 ENCOUNTER — CLINICAL SUPPORT (OUTPATIENT)
Dept: REHABILITATION | Facility: HOSPITAL | Age: 40
End: 2025-05-06
Payer: COMMERCIAL

## 2025-05-06 DIAGNOSIS — G89.29 CHRONIC RIGHT-SIDED LOW BACK PAIN WITHOUT SCIATICA: ICD-10-CM

## 2025-05-06 DIAGNOSIS — M54.50 CHRONIC RIGHT-SIDED LOW BACK PAIN WITHOUT SCIATICA: ICD-10-CM

## 2025-05-06 DIAGNOSIS — R29.3 ABNORMAL POSTURE: Primary | ICD-10-CM

## 2025-05-06 DIAGNOSIS — M25.532 LEFT WRIST PAIN: ICD-10-CM

## 2025-05-06 PROCEDURE — 73110 X-RAY EXAM OF WRIST: CPT | Mod: TC,LT

## 2025-05-06 PROCEDURE — 97530 THERAPEUTIC ACTIVITIES: CPT | Performed by: PHYSICAL THERAPIST

## 2025-05-06 PROCEDURE — 97112 NEUROMUSCULAR REEDUCATION: CPT | Performed by: PHYSICAL THERAPIST

## 2025-05-06 PROCEDURE — 73110 X-RAY EXAM OF WRIST: CPT | Mod: 26,LT,, | Performed by: RADIOLOGY

## 2025-05-06 NOTE — PROGRESS NOTES
"Outpatient Rehab    Physical Therapy Visit    Patient Name: Azam Gregory  MRN: [id number]  YOB: 1985  Encounter Date: 5/6/2025    Therapy Diagnosis:   Encounter Diagnoses   Name Primary?    Abnormal posture Yes    Chronic right-sided low back pain without sciatica      Physician: Rona Pizano*    Physician Orders: Eval and Treat  Medical Diagnosis: Back pain, unspecified back location, unspecified back pain laterality, unspecified chronicity    Visit # / Visits Authorized:  6 / 20  Insurance Authorization Period: 3/7/2025 to 12/31/2025  Date of Evaluation: 3/10/2025  Plan of Care Certification: 3/10/2025 to 9/10/2025     PT/PTA:     Number of PTA visits since last PT visit:   Time In: 1610   Time Out: 1710  Total Time: 60   Total Billable Time:        Subjective   Lower-back feeling better since last session..  Pain reported as 1/10.      Objective    Objective Measures updated at progress report unless specified        Treatment: PT Tech Extender utilized under supervision throughout tx session  Balance/Neuromuscular Re-Education  NMR 1: Side planks on knees 3x30"  NMR 2: Dead bugs with LE ext 3x10  NMR 3: Palloff press 10# 3x15  NMR 4: Aaron Curls 2 x 10  Therapeutic Activity  TA 1: Upright bike 8 minutes  TA 2: Standing Paloff CC 4 x 10 ea  TA 3: Tall Kneel Lat Pulldowns 4 x 10  TA 4: Bird Dog Rows 4 x 10    Time Entry(in minutes):  Neuromuscular Re-Education Time Entry: 30  Therapeutic Activity Time Entry: 30    Assessment & Plan   Assessment: Worked in more hip hinge patterning with progression to deadlift and row-based activities with neutral spine. Progress lower-back endurance loads moving forward.       Patient will continue to benefit from skilled outpatient physical therapy to address the deficits listed in the problem list box on initial evaluation, provide pt/family education and to maximize pt's level of independence in the home and community environment.     Patient's " spiritual, cultural, and educational needs considered and patient agreeable to plan of care and goals.           Plan: Progress activity per POC    Goals:   Short Term Goals: 6 weeks  Pt will report decreased lower-back pain  < / =  5/10  to increase tolerance for ADL performance and recreational routine.  Pt will improve lower-back ROM to WFL in order to be able to perform ADLs without difficulty.  Pt will improve lower-back strength by 1/3 MMT grade to increase tolerance for ADL and work activities  Pt will demonstrate tolerance to HEP to improve independence with ADL's     Long Term Goals: 24 weeks  Pt will report decreased lower-back pain  < / =  3/10  to increase tolerance for ADL performance and recreational routine  Pt will improve lower-back ROM to WNL in order to be able to perform ADLs without difficulty  Pt will improve lower-back strength by 1/3 MMT grade to increase tolerance for ADL and work activities  Pt will report at CJ level (20-40% impaired) on FOTO Back to demonstrate increase in LE function with every day tasks.     Paresh Mejía, PT, DPT

## 2025-05-09 DIAGNOSIS — K30 UPSET STOMACH: ICD-10-CM

## 2025-05-09 DIAGNOSIS — Z51.89 THERAPY: ICD-10-CM

## 2025-05-12 RX ORDER — ONDANSETRON 4 MG/1
4 TABLET, FILM COATED ORAL EVERY 8 HOURS PRN
Qty: 60 TABLET | Refills: 0 | Status: SHIPPED | OUTPATIENT
Start: 2025-05-12

## 2025-05-20 ENCOUNTER — CLINICAL SUPPORT (OUTPATIENT)
Dept: REHABILITATION | Facility: HOSPITAL | Age: 40
End: 2025-05-20
Payer: COMMERCIAL

## 2025-05-20 DIAGNOSIS — R29.3 ABNORMAL POSTURE: Primary | ICD-10-CM

## 2025-05-20 DIAGNOSIS — G89.29 CHRONIC RIGHT-SIDED LOW BACK PAIN WITHOUT SCIATICA: ICD-10-CM

## 2025-05-20 DIAGNOSIS — M54.50 CHRONIC RIGHT-SIDED LOW BACK PAIN WITHOUT SCIATICA: ICD-10-CM

## 2025-05-20 PROCEDURE — 97112 NEUROMUSCULAR REEDUCATION: CPT | Performed by: PHYSICAL THERAPIST

## 2025-05-20 PROCEDURE — 97530 THERAPEUTIC ACTIVITIES: CPT | Performed by: PHYSICAL THERAPIST

## 2025-05-22 NOTE — PROGRESS NOTES
"Outpatient Rehab    Physical Therapy Visit    Patient Name: Azam Gregory  MRN: [id number]  YOB: 1985  Encounter Date: 5/20/2025    Therapy Diagnosis:   Encounter Diagnoses   Name Primary?    Abnormal posture Yes    Chronic right-sided low back pain without sciatica      Physician: Rona Pizano*    Physician Orders: Eval and Treat  Medical Diagnosis: Back pain, unspecified back location, unspecified back pain laterality, unspecified chronicity    Visit # / Visits Authorized:  7 / 20  Insurance Authorization Period: 3/7/2025 to 12/31/2025  Date of Evaluation: 3/10/2025  Plan of Care Certification: 3/10/2025 to 9/10/2025     PT/PTA:     Number of PTA visits since last PT visit:   Time In: 1630   Time Out: 1430  Total Time: 1320   Total Billable Time:        Subjective   Continues to feel stronger with daily routine..  Pain reported as 0/10.      Objective    Objective Measures updated at progress report unless specified        Treatment: PT Tech Extender utilized under supervision throughout tx session  Balance/Neuromuscular Re-Education  NMR 1: Side planks on knees 3x30"  NMR 2: Dead bugs with LE ext 3x10  NMR 3: Palloff press 10# 3x15  Therapeutic Activity  TA 1: Upright bike 8 minutes  TA 2: Standing Paloff CC 4 x 10 ea  TA 3: Tall Kneel Lat Pulldowns 4 x 10  TA 4: Bird Dog Rows 4 x 10    Time Entry(in minutes):  Neuromuscular Re-Education Time Entry: 15  Therapeutic Activity Time Entry: 45    Assessment & Plan   Assessment: Continued to progress lower-back neutral position stability demands. Able to tolerate more load with less FRANNIE and maintenance of neutral spine.       Patient will continue to benefit from skilled outpatient physical therapy to address the deficits listed in the problem list box on initial evaluation, provide pt/family education and to maximize pt's level of independence in the home and community environment.     Patient's spiritual, cultural, and educational needs considered " Patient is a 6 year old male that presents to the ED with mom and sister with c/o right ear pain.    and patient agreeable to plan of care and goals.           Plan: Progress activity per POC    Goals:   Short Term Goals: 6 weeks  Pt will report decreased lower-back pain  < / =  5/10  to increase tolerance for ADL performance and recreational routine.  Pt will improve lower-back ROM to WFL in order to be able to perform ADLs without difficulty.  Pt will improve lower-back strength by 1/3 MMT grade to increase tolerance for ADL and work activities  Pt will demonstrate tolerance to HEP to improve independence with ADL's     Long Term Goals: 24 weeks  Pt will report decreased lower-back pain  < / =  3/10  to increase tolerance for ADL performance and recreational routine  Pt will improve lower-back ROM to WNL in order to be able to perform ADLs without difficulty  Pt will improve lower-back strength by 1/3 MMT grade to increase tolerance for ADL and work activities  Pt will report at CJ level (20-40% impaired) on FOTO Back to demonstrate increase in LE function with every day tasks.     Paresh Mejía, PT, DPT

## 2025-05-26 DIAGNOSIS — M25.539 WRIST PAIN: Primary | ICD-10-CM

## 2025-05-30 ENCOUNTER — TELEPHONE (OUTPATIENT)
Dept: ORTHOPEDICS | Facility: CLINIC | Age: 40
End: 2025-05-30
Payer: COMMERCIAL

## 2025-05-30 NOTE — TELEPHONE ENCOUNTER
Spoke with patient and confirmed appointment and xray appointment    Derrick Fernández OT  [facility]  P: [phone]  F: [phone]

## 2025-06-03 ENCOUNTER — HOSPITAL ENCOUNTER (OUTPATIENT)
Dept: RADIOLOGY | Facility: OTHER | Age: 40
Discharge: HOME | End: 2025-06-03
Attending: ORTHOPAEDIC SURGERY
Payer: COMMERCIAL

## 2025-06-03 ENCOUNTER — CLINICAL SUPPORT (OUTPATIENT)
Dept: REHABILITATION | Facility: HOSPITAL | Age: 40
End: 2025-06-03
Payer: COMMERCIAL

## 2025-06-03 DIAGNOSIS — M54.50 CHRONIC RIGHT-SIDED LOW BACK PAIN WITHOUT SCIATICA: ICD-10-CM

## 2025-06-03 DIAGNOSIS — G89.29 CHRONIC RIGHT-SIDED LOW BACK PAIN WITHOUT SCIATICA: ICD-10-CM

## 2025-06-03 DIAGNOSIS — R29.3 ABNORMAL POSTURE: Primary | ICD-10-CM

## 2025-06-03 DIAGNOSIS — M25.539 WRIST PAIN: ICD-10-CM

## 2025-06-03 PROCEDURE — 97530 THERAPEUTIC ACTIVITIES: CPT | Performed by: PHYSICAL THERAPIST

## 2025-06-03 PROCEDURE — 73110 X-RAY EXAM OF WRIST: CPT | Mod: TC,50,FY

## 2025-06-03 PROCEDURE — 73110 X-RAY EXAM OF WRIST: CPT | Mod: 26,50,, | Performed by: RADIOLOGY

## 2025-06-09 NOTE — PROGRESS NOTES
Outpatient Rehab    Physical Therapy Visit    Patient Name: Azam Gregory  MRN: [id number]  YOB: 1985  Encounter Date: 6/3/2025    Therapy Diagnosis:   Encounter Diagnoses   Name Primary?    Abnormal posture Yes    Chronic right-sided low back pain without sciatica      Physician: Rona Pizano*    Physician Orders: Eval and Treat  Medical Diagnosis: Back pain, unspecified back location, unspecified back pain laterality, unspecified chronicity    Visit # / Visits Authorized:  8 / 20  Insurance Authorization Period: 3/7/2025 to 12/31/2025  Date of Evaluation: 3/10/2025  Plan of Care Certification: 3/10/2025 to 9/10/2025     PT/PTA:     Number of PTA visits since last PT visit:   Time In: 1600   Time Out: 1705  Total Time: 65   Total Billable Time:        Subjective   Continued improvement in LBP each session..  Pain reported as 0/10.      Objective    Objective Measures updated at progress report unless specified        Treatment: PT Tech Extender utilized under supervision throughout tx session  Therapeutic Activity  TA 1: Upright bike 8 minutes  TA 2: Standing Paloff CC 4 x 10 ea  TA 3: Tall Kneel Lat Pulldowns 4 x 10  TA 4: Bird Dog Rows 4 x 10  TA 5: Plank variations x 15'    Time Entry(in minutes):  Therapeutic Activity Time Entry: 65    Assessment & Plan   Assessment: Worked in more plank variations and tall kneel variations with improved lower-back and core control. Continued to tolerate increased demands; expect good carry-over to daily routine.       Patient will continue to benefit from skilled outpatient physical therapy to address the deficits listed in the problem list box on initial evaluation, provide pt/family education and to maximize pt's level of independence in the home and community environment.     Patient's spiritual, cultural, and educational needs considered and patient agreeable to plan of care and goals.           Plan: Progress activity per POC    Goals:   Short Term  Goals: 6 weeks  Pt will report decreased lower-back pain  < / =  5/10  to increase tolerance for ADL performance and recreational routine.  Pt will improve lower-back ROM to WFL in order to be able to perform ADLs without difficulty.  Pt will improve lower-back strength by 1/3 MMT grade to increase tolerance for ADL and work activities  Pt will demonstrate tolerance to HEP to improve independence with ADL's     Long Term Goals: 24 weeks  Pt will report decreased lower-back pain  < / =  3/10  to increase tolerance for ADL performance and recreational routine  Pt will improve lower-back ROM to WNL in order to be able to perform ADLs without difficulty  Pt will improve lower-back strength by 1/3 MMT grade to increase tolerance for ADL and work activities  Pt will report at CJ level (20-40% impaired) on FOTO Back to demonstrate increase in LE function with every day tasks.     Paresh Mejía, PT, DPT

## 2025-06-17 ENCOUNTER — CLINICAL SUPPORT (OUTPATIENT)
Dept: REHABILITATION | Facility: HOSPITAL | Age: 40
End: 2025-06-17
Attending: PHYSICAL THERAPIST
Payer: COMMERCIAL

## 2025-06-17 DIAGNOSIS — G89.29 CHRONIC RIGHT-SIDED LOW BACK PAIN WITHOUT SCIATICA: ICD-10-CM

## 2025-06-17 DIAGNOSIS — R29.3 ABNORMAL POSTURE: Primary | ICD-10-CM

## 2025-06-17 DIAGNOSIS — M54.50 CHRONIC RIGHT-SIDED LOW BACK PAIN WITHOUT SCIATICA: ICD-10-CM

## 2025-06-17 PROCEDURE — 97530 THERAPEUTIC ACTIVITIES: CPT | Performed by: PHYSICAL THERAPIST

## 2025-06-22 NOTE — PROGRESS NOTES
Outpatient Rehab    Physical Therapy Visit    Patient Name: Azam Gregory  MRN: [id number]  YOB: 1985  Encounter Date: 6/17/2025    Therapy Diagnosis:   Encounter Diagnoses   Name Primary?    Abnormal posture Yes    Chronic right-sided low back pain without sciatica      Physician: Rona Pizano*    Physician Orders: Eval and Treat  Medical Diagnosis: Back pain, unspecified back location, unspecified back pain laterality, unspecified chronicity    Visit # / Visits Authorized:  9 / 20  Insurance Authorization Period: 3/7/2025 to 12/31/2025  Date of Evaluation: 3/10/2025  Plan of Care Certification: 3/10/2025 to 9/10/2025     PT/PTA:     Number of PTA visits since last PT visit:   Time In: 1730   Time Out: 1830  Total Time: 60   Total Billable Time:        Subjective   Improved tolerance to carrying activities..  Pain reported as 0/10.      Objective    Objective Measures updated at progress report unless specified        Treatment: PT Tech Extender utilized under supervision throughout tx session  Therapeutic Activity  TA 1: Upright bike 8 minutes  TA 2: Standing Paloff CC 4 x 10 ea  TA 3: Tall Kneel Lat Pulldowns 4 x 10  TA 4: Bird Dog Rows 4 x 10  TA 5: Plank variations x 15'    Time Entry(in minutes):  Therapeutic Activity Time Entry: 60    Assessment & Plan   Assessment: Worked in more reactive OH carrying activities. Still needs occasional cueing for avoidance of hyperextension, but able to self-correct more appropriately than in the past.       Patient will continue to benefit from skilled outpatient physical therapy to address the deficits listed in the problem list box on initial evaluation, provide pt/family education and to maximize pt's level of independence in the home and community environment.     Patient's spiritual, cultural, and educational needs considered and patient agreeable to plan of care and goals.           Plan: Progress activity per POC    Goals:   Short Term Goals: 6  weeks  Pt will report decreased lower-back pain  < / =  5/10  to increase tolerance for ADL performance and recreational routine.  Pt will improve lower-back ROM to WFL in order to be able to perform ADLs without difficulty.  Pt will improve lower-back strength by 1/3 MMT grade to increase tolerance for ADL and work activities  Pt will demonstrate tolerance to HEP to improve independence with ADL's     Long Term Goals: 24 weeks  Pt will report decreased lower-back pain  < / =  3/10  to increase tolerance for ADL performance and recreational routine  Pt will improve lower-back ROM to WNL in order to be able to perform ADLs without difficulty  Pt will improve lower-back strength by 1/3 MMT grade to increase tolerance for ADL and work activities  Pt will report at CJ level (20-40% impaired) on FOTO Back to demonstrate increase in LE function with every day tasks.     Paresh Mejía, PT, DPT

## 2025-06-24 ENCOUNTER — CLINICAL SUPPORT (OUTPATIENT)
Dept: REHABILITATION | Facility: HOSPITAL | Age: 40
End: 2025-06-24
Payer: COMMERCIAL

## 2025-06-24 DIAGNOSIS — G89.29 CHRONIC RIGHT-SIDED LOW BACK PAIN WITHOUT SCIATICA: ICD-10-CM

## 2025-06-24 DIAGNOSIS — M54.50 CHRONIC RIGHT-SIDED LOW BACK PAIN WITHOUT SCIATICA: ICD-10-CM

## 2025-06-24 DIAGNOSIS — R29.3 ABNORMAL POSTURE: Primary | ICD-10-CM

## 2025-06-24 PROCEDURE — 97530 THERAPEUTIC ACTIVITIES: CPT | Performed by: PHYSICAL THERAPIST

## 2025-06-26 NOTE — PROGRESS NOTES
Outpatient Rehab    Physical Therapy Visit    Patient Name: Azam Gregory  MRN: [id number]  YOB: 1985  Encounter Date: 6/24/2025    Therapy Diagnosis:   Encounter Diagnoses   Name Primary?    Abnormal posture Yes    Chronic right-sided low back pain without sciatica      Physician: Rona Pizano*    Physician Orders: Eval and Treat  Medical Diagnosis: Back pain, unspecified back location, unspecified back pain laterality, unspecified chronicity    Visit # / Visits Authorized:  10 / 20  Insurance Authorization Period: 3/7/2025 to 12/31/2025  Date of Evaluation: 3/10/2025  Plan of Care Certification: 3/10/2025 to 9/10/2025     PT/PTA:     Number of PTA visits since last PT visit:   Time In: 1620   Time Out: 1725  Total Time: 65   Total Billable Time:        Subjective   No adverse lower-back sx behavior despite heavier loading last session..  Pain reported as 0/10.      Objective    Objective Measures updated at progress report unless specified        Treatment: PT Tech Extender utilized under supervision throughout tx session  Therapeutic Activity  TA 1: Elliptical x 10'  TA 2: Standing Paloff CC 4 x 10 ea  TA 3: Tall Kneel Lat Pulldowns 4 x 10  TA 4: Bird Dog Rows 4 x 10  TA 5: Plank variations x 15'    Time Entry(in minutes):  Therapeutic Activity Time Entry: 65    Assessment & Plan   Assessment: Able to work in more 1/2 kneel and tall kneel variations, with progressions to lunge and staggered resistance chops and lifts. Continue OH carrying capacity.       Patient will continue to benefit from skilled outpatient physical therapy to address the deficits listed in the problem list box on initial evaluation, provide pt/family education and to maximize pt's level of independence in the home and community environment.     Patient's spiritual, cultural, and educational needs considered and patient agreeable to plan of care and goals.           Plan: Progress activity per POC    Goals:   Short Term  Goals: 6 weeks  Pt will report decreased lower-back pain  < / =  5/10  to increase tolerance for ADL performance and recreational routine.  Pt will improve lower-back ROM to WFL in order to be able to perform ADLs without difficulty.  Pt will improve lower-back strength by 1/3 MMT grade to increase tolerance for ADL and work activities  Pt will demonstrate tolerance to HEP to improve independence with ADL's     Long Term Goals: 24 weeks  Pt will report decreased lower-back pain  < / =  3/10  to increase tolerance for ADL performance and recreational routine  Pt will improve lower-back ROM to WNL in order to be able to perform ADLs without difficulty  Pt will improve lower-back strength by 1/3 MMT grade to increase tolerance for ADL and work activities  Pt will report at CJ level (20-40% impaired) on FOTO Back to demonstrate increase in LE function with every day tasks.     Paresh Mejía, PT, DPT

## 2025-07-13 DIAGNOSIS — K30 UPSET STOMACH: ICD-10-CM

## 2025-07-13 DIAGNOSIS — Z51.89 THERAPY: ICD-10-CM

## 2025-07-14 RX ORDER — ONDANSETRON 4 MG/1
4 TABLET, FILM COATED ORAL EVERY 8 HOURS PRN
Qty: 60 TABLET | Refills: 0 | Status: SHIPPED | OUTPATIENT
Start: 2025-07-14

## 2025-07-15 ENCOUNTER — CLINICAL SUPPORT (OUTPATIENT)
Dept: REHABILITATION | Facility: HOSPITAL | Age: 40
End: 2025-07-15
Payer: COMMERCIAL

## 2025-07-15 DIAGNOSIS — R29.3 ABNORMAL POSTURE: Primary | ICD-10-CM

## 2025-07-15 DIAGNOSIS — G89.29 CHRONIC RIGHT-SIDED LOW BACK PAIN WITHOUT SCIATICA: ICD-10-CM

## 2025-07-15 DIAGNOSIS — M54.50 CHRONIC RIGHT-SIDED LOW BACK PAIN WITHOUT SCIATICA: ICD-10-CM

## 2025-07-15 PROCEDURE — 97530 THERAPEUTIC ACTIVITIES: CPT | Performed by: PHYSICAL THERAPIST

## 2025-07-16 NOTE — PROGRESS NOTES
Outpatient Rehab    Physical Therapy Visit    Patient Name: Azam Gregory  MRN: [id number]  YOB: 1985  Encounter Date: 7/15/2025    Therapy Diagnosis:   Encounter Diagnoses   Name Primary?    Abnormal posture Yes    Chronic right-sided low back pain without sciatica      Physician: Rona Pizano*    Physician Orders: Eval and Treat  Medical Diagnosis: Back pain, unspecified back location, unspecified back pain laterality, unspecified chronicity    Visit # / Visits Authorized:  11 / 20  Insurance Authorization Period: 3/7/2025 to 12/31/2025  Date of Evaluation: 3/10/2025  Plan of Care Certification: 3/10/2025 to 9/10/2025     PT/PTA:     Number of PTA visits since last PT visit:   Time In: 1600   Time Out: 1705  Total Time: 65   Total Billable Time:        Subjective   Overall lower-back sx behavior continues to improve..  Pain reported as 1/10.      Objective    Objective Measures updated at progress report unless specified        Treatment: PT Tech Extender utilized under supervision throughout tx session  Therapeutic Activity  TA 1: Elliptical x 10'  TA 2: Dynamic: 1/2 kneel hip flexor, SL bridges x 30 ea  TA 3: Lunge Bumper Press 4 x 10 ea  TA 4: Tall Kneel bumper OH lifts 4 x 10 ea  TA 5: Plank variations x 15'    Time Entry(in minutes):  Therapeutic Activity Time Entry: 65    Assessment & Plan   Assessment: Continued to work in more abdominal stability work with stresses on lower-back stability and need for avoidance of lumbar hyperextension. Still tends to fault with increased load, but improved tolerance to activity.       Patient will continue to benefit from skilled outpatient physical therapy to address the deficits listed in the problem list box on initial evaluation, provide pt/family education and to maximize pt's level of independence in the home and community environment.     Patient's spiritual, cultural, and educational needs considered and patient agreeable to plan of care and  goals.           Plan: Continue with current plan of care.    Goals:   Short Term Goals: 6 weeks  Pt will report decreased lower-back pain  < / =  5/10  to increase tolerance for ADL performance and recreational routine.  Pt will improve lower-back ROM to WFL in order to be able to perform ADLs without difficulty.  Pt will improve lower-back strength by 1/3 MMT grade to increase tolerance for ADL and work activities  Pt will demonstrate tolerance to HEP to improve independence with ADL's     Long Term Goals: 24 weeks  Pt will report decreased lower-back pain  < / =  3/10  to increase tolerance for ADL performance and recreational routine  Pt will improve lower-back ROM to WNL in order to be able to perform ADLs without difficulty  Pt will improve lower-back strength by 1/3 MMT grade to increase tolerance for ADL and work activities  Pt will report at CJ level (20-40% impaired) on FOTO Back to demonstrate increase in LE function with every day tasks.     Paresh Mejía, PT, DPT

## 2025-07-22 ENCOUNTER — CLINICAL SUPPORT (OUTPATIENT)
Dept: REHABILITATION | Facility: HOSPITAL | Age: 40
End: 2025-07-22
Payer: COMMERCIAL

## 2025-07-22 DIAGNOSIS — R29.3 ABNORMAL POSTURE: Primary | ICD-10-CM

## 2025-07-22 DIAGNOSIS — G89.29 CHRONIC RIGHT-SIDED LOW BACK PAIN WITHOUT SCIATICA: ICD-10-CM

## 2025-07-22 DIAGNOSIS — M54.50 CHRONIC RIGHT-SIDED LOW BACK PAIN WITHOUT SCIATICA: ICD-10-CM

## 2025-07-22 PROCEDURE — 97530 THERAPEUTIC ACTIVITIES: CPT | Performed by: PHYSICAL THERAPIST

## 2025-07-23 NOTE — PROGRESS NOTES
Outpatient Rehab    Physical Therapy Visit    Patient Name: Azam Gregory  MRN: [id number]  YOB: 1985  Encounter Date: 7/22/2025    Therapy Diagnosis:   Encounter Diagnoses   Name Primary?    Abnormal posture Yes    Chronic right-sided low back pain without sciatica      Physician: Rona Pizano*    Physician Orders: Eval and Treat  Medical Diagnosis: Back pain, unspecified back location, unspecified back pain laterality, unspecified chronicity    Visit # / Visits Authorized:  12 / 20  Insurance Authorization Period: 3/7/2025 to 12/31/2025  Date of Evaluation: 3/10/2025  Plan of Care Certification: 3/10/2025 to 9/10/2025     PT/PTA:     Number of PTA visits since last PT visit:   Time In: 1610   Time Out: 1710  Total Time: 60   Total Billable Time:        Subjective   No new issues; sore after sessions, but no pain..  Pain reported as 0/10.      Objective    Objective Measures updated at progress report unless specified        Treatment: PT Tech Extender utilized under supervision throughout tx session  Therapeutic Activity  TA 1: Elliptical x 10'  TA 2: Dynamic: 1/2 kneel hip flexor, SL bridges x 30 ea  TA 3: Plank c Row 10# 4 x 10 ea  TA 4: Landmine Double Arm Press 4 x 10    Time Entry(in minutes):  Therapeutic Activity Time Entry: 60    Assessment & Plan   Assessment: Reports improvement in tolerance to ADLs without rate limiting back pain. Introduced landmine variations into program and will work in more pressing and lifting motions moving forward.  Evaluation/Treatment Tolerance: Patient tolerated treatment well    Patient will continue to benefit from skilled outpatient physical therapy to address the deficits listed in the problem list box on initial evaluation, provide pt/family education and to maximize pt's level of independence in the home and community environment.     Patient's spiritual, cultural, and educational needs considered and patient agreeable to plan of care and goals.            Plan: Continue with current plan of care.    Goals:   Short Term Goals: 6 weeks  Pt will report decreased lower-back pain  < / =  5/10  to increase tolerance for ADL performance and recreational routine.  Pt will improve lower-back ROM to WFL in order to be able to perform ADLs without difficulty.  Pt will improve lower-back strength by 1/3 MMT grade to increase tolerance for ADL and work activities  Pt will demonstrate tolerance to HEP to improve independence with ADL's     Long Term Goals: 24 weeks  Pt will report decreased lower-back pain  < / =  3/10  to increase tolerance for ADL performance and recreational routine  Pt will improve lower-back ROM to WNL in order to be able to perform ADLs without difficulty  Pt will improve lower-back strength by 1/3 MMT grade to increase tolerance for ADL and work activities  Pt will report at CJ level (20-40% impaired) on FOTO Back to demonstrate increase in LE function with every day tasks.     Paresh Mejía, PT, DPT

## 2025-07-29 ENCOUNTER — CLINICAL SUPPORT (OUTPATIENT)
Dept: REHABILITATION | Facility: HOSPITAL | Age: 40
End: 2025-07-29
Payer: COMMERCIAL

## 2025-07-29 DIAGNOSIS — M54.50 CHRONIC RIGHT-SIDED LOW BACK PAIN WITHOUT SCIATICA: ICD-10-CM

## 2025-07-29 DIAGNOSIS — R29.3 ABNORMAL POSTURE: Primary | ICD-10-CM

## 2025-07-29 DIAGNOSIS — G89.29 CHRONIC RIGHT-SIDED LOW BACK PAIN WITHOUT SCIATICA: ICD-10-CM

## 2025-07-29 PROCEDURE — 97530 THERAPEUTIC ACTIVITIES: CPT | Performed by: PHYSICAL THERAPIST

## 2025-07-30 NOTE — PROGRESS NOTES
Outpatient Rehab    Physical Therapy Visit    Patient Name: Azam Gregory  MRN: [id number]  YOB: 1985  Encounter Date: 7/29/2025    Therapy Diagnosis:   Encounter Diagnoses   Name Primary?    Abnormal posture Yes    Chronic right-sided low back pain without sciatica      Physician: Rona Pizano*    Physician Orders: Eval and Treat  Medical Diagnosis: Back pain, unspecified back location, unspecified back pain laterality, unspecified chronicity    Visit # / Visits Authorized:  13 / 20  Insurance Authorization Period: 3/7/2025 to 12/31/2025  Date of Evaluation: 3/10/2025  Plan of Care Certification: 3/10/2025 to 9/10/2025     PT/PTA:     Number of PTA visits since last PT visit:   Time In: 1615   Time Out: 1715  Total Time: 60   Total Billable Time:        Subjective   Feels like standing tolerance continues to improve..  Pain reported as 1/10.      Objective    Objective Measures updated at progress report unless specified        Treatment: PT Tech Extender utilized under supervision throughout tx session  Therapeutic Activity  TA 1: Elliptical x 10'  TA 2: Dynamic: 1/2 kneel hip flexor, SL bridges x 30 ea  TA 3: GHD extensions 4 x 10  TA 4: GHD Extension hold c row 4 x 10 ea  TA 5: 1080 Sprint: Lateral Walks c Eccentric overload x 10 laps  TA 6: 1080 Sprint: Retro Walks c Eccentric overload x 10 laps    Time Entry(in minutes):  Therapeutic Activity Time Entry: 60    Assessment & Plan   Assessment: Worked in more intentional extension demands with work on GHD. Unsupported lower-back extension challenging to maintain, but will improve in subsequent sessions with increased adherence to form and load.  Evaluation/Treatment Tolerance: Patient tolerated treatment well    Patient will continue to benefit from skilled outpatient physical therapy to address the deficits listed in the problem list box on initial evaluation, provide pt/family education and to maximize pt's level of independence in the home  and community environment.     Patient's spiritual, cultural, and educational needs considered and patient agreeable to plan of care and goals.           Plan: Continue with current plan of care.    Goals:   Short Term Goals: 6 weeks  Pt will report decreased lower-back pain  < / =  5/10  to increase tolerance for ADL performance and recreational routine.  Pt will improve lower-back ROM to WFL in order to be able to perform ADLs without difficulty.  Pt will improve lower-back strength by 1/3 MMT grade to increase tolerance for ADL and work activities  Pt will demonstrate tolerance to HEP to improve independence with ADL's     Long Term Goals: 24 weeks  Pt will report decreased lower-back pain  < / =  3/10  to increase tolerance for ADL performance and recreational routine  Pt will improve lower-back ROM to WNL in order to be able to perform ADLs without difficulty  Pt will improve lower-back strength by 1/3 MMT grade to increase tolerance for ADL and work activities  Pt will report at CJ level (20-40% impaired) on FOTO Back to demonstrate increase in LE function with every day tasks.     Paresh Mejía, PT, DPT

## 2025-08-05 ENCOUNTER — CLINICAL SUPPORT (OUTPATIENT)
Dept: REHABILITATION | Facility: HOSPITAL | Age: 40
End: 2025-08-05
Payer: COMMERCIAL

## 2025-08-05 DIAGNOSIS — M54.50 CHRONIC RIGHT-SIDED LOW BACK PAIN WITHOUT SCIATICA: ICD-10-CM

## 2025-08-05 DIAGNOSIS — G89.29 CHRONIC RIGHT-SIDED LOW BACK PAIN WITHOUT SCIATICA: ICD-10-CM

## 2025-08-05 DIAGNOSIS — R29.3 ABNORMAL POSTURE: Primary | ICD-10-CM

## 2025-08-05 PROCEDURE — 97530 THERAPEUTIC ACTIVITIES: CPT | Performed by: PHYSICAL THERAPIST

## 2025-08-06 NOTE — PROGRESS NOTES
Outpatient Rehab    Physical Therapy Visit    Patient Name: Azam Gregory  MRN: [id number]  YOB: 1985  Encounter Date: 8/5/2025    Therapy Diagnosis:   Encounter Diagnoses   Name Primary?    Abnormal posture Yes    Chronic right-sided low back pain without sciatica      Physician: Rona Pizano*    Physician Orders: Eval and Treat  Medical Diagnosis: Back pain, unspecified back location, unspecified back pain laterality, unspecified chronicity    Visit # / Visits Authorized:  14 / 20  Insurance Authorization Period: 3/7/2025 to 12/31/2025  Date of Evaluation: 3/10/2025  Plan of Care Certification: 3/10/2025 to 9/10/2025     PT/PTA:     Number of PTA visits since last PT visit:   Time In: 1605   Time Out: 1705  Total Time: 60   Total Billable Time:        Subjective   Overall carry-over to functional routine continues to improve..  Pain reported as 1/10.      Objective    Objective Measures updated at progress report unless specified        Treatment: PT Tech Extender utilized under supervision throughout tx session  Therapeutic Activity  TA 1: Elliptical x 10'  TA 2: Dynamic: 1/2 kneel hip flexor, SL bridges x 30 ea  TA 3: GHD extensions 4 x 10  TA 4: GHD Extension hold c row 4 x 10 ea  TA 5: Tall Kneel KB around the [place] 10# 4 x 10    Time Entry(in minutes):  Therapeutic Activity Time Entry: 60    Assessment & Plan   Assessment: Continued to progress extensor endurance strength with GHD and tall kneel variations. Worked in more stability demands across a variety of interventions, but improved maintenance of neutral spine positioning across demands.       Patient will continue to benefit from skilled outpatient physical therapy to address the deficits listed in the problem list box on initial evaluation, provide pt/family education and to maximize pt's level of independence in the home and community environment.     Patient's spiritual, cultural, and educational needs considered and patient  agreeable to plan of care and goals.           Plan: Continue with current plan of care.    Goals:   Short Term Goals: 6 weeks  Pt will report decreased lower-back pain  < / =  5/10  to increase tolerance for ADL performance and recreational routine.  Pt will improve lower-back ROM to WFL in order to be able to perform ADLs without difficulty.  Pt will improve lower-back strength by 1/3 MMT grade to increase tolerance for ADL and work activities  Pt will demonstrate tolerance to HEP to improve independence with ADL's     Long Term Goals: 24 weeks  Pt will report decreased lower-back pain  < / =  3/10  to increase tolerance for ADL performance and recreational routine  Pt will improve lower-back ROM to WNL in order to be able to perform ADLs without difficulty  Pt will improve lower-back strength by 1/3 MMT grade to increase tolerance for ADL and work activities  Pt will report at CJ level (20-40% impaired) on FOTO Back to demonstrate increase in LE function with every day tasks.     Paresh Mejía, PT, DPT

## 2025-08-12 ENCOUNTER — CLINICAL SUPPORT (OUTPATIENT)
Dept: REHABILITATION | Facility: HOSPITAL | Age: 40
End: 2025-08-12
Payer: COMMERCIAL

## 2025-08-12 DIAGNOSIS — G89.29 CHRONIC RIGHT-SIDED LOW BACK PAIN WITHOUT SCIATICA: ICD-10-CM

## 2025-08-12 DIAGNOSIS — M54.50 CHRONIC RIGHT-SIDED LOW BACK PAIN WITHOUT SCIATICA: ICD-10-CM

## 2025-08-12 DIAGNOSIS — R29.3 ABNORMAL POSTURE: Primary | ICD-10-CM

## 2025-08-12 PROCEDURE — 97530 THERAPEUTIC ACTIVITIES: CPT | Performed by: PHYSICAL THERAPIST

## 2025-08-19 ENCOUNTER — CLINICAL SUPPORT (OUTPATIENT)
Dept: REHABILITATION | Facility: HOSPITAL | Age: 40
End: 2025-08-19
Payer: COMMERCIAL

## 2025-08-19 DIAGNOSIS — R29.3 ABNORMAL POSTURE: Primary | ICD-10-CM

## 2025-08-19 DIAGNOSIS — M54.50 CHRONIC RIGHT-SIDED LOW BACK PAIN WITHOUT SCIATICA: ICD-10-CM

## 2025-08-19 DIAGNOSIS — G89.29 CHRONIC RIGHT-SIDED LOW BACK PAIN WITHOUT SCIATICA: ICD-10-CM

## 2025-08-19 PROCEDURE — 97530 THERAPEUTIC ACTIVITIES: CPT | Performed by: PHYSICAL THERAPIST

## 2025-08-26 ENCOUNTER — CLINICAL SUPPORT (OUTPATIENT)
Dept: REHABILITATION | Facility: HOSPITAL | Age: 40
End: 2025-08-26
Payer: COMMERCIAL

## 2025-08-26 DIAGNOSIS — R29.3 ABNORMAL POSTURE: Primary | ICD-10-CM

## 2025-08-26 DIAGNOSIS — M54.50 CHRONIC RIGHT-SIDED LOW BACK PAIN WITHOUT SCIATICA: ICD-10-CM

## 2025-08-26 DIAGNOSIS — G89.29 CHRONIC RIGHT-SIDED LOW BACK PAIN WITHOUT SCIATICA: ICD-10-CM

## 2025-08-26 PROCEDURE — 97530 THERAPEUTIC ACTIVITIES: CPT | Performed by: PHYSICAL THERAPIST
